# Patient Record
Sex: MALE | Race: WHITE | NOT HISPANIC OR LATINO | Employment: OTHER | URBAN - METROPOLITAN AREA
[De-identification: names, ages, dates, MRNs, and addresses within clinical notes are randomized per-mention and may not be internally consistent; named-entity substitution may affect disease eponyms.]

---

## 2017-08-10 ENCOUNTER — APPOINTMENT (OUTPATIENT)
Dept: RADIOLOGY | Facility: MEDICAL CENTER | Age: 67
DRG: 065 | End: 2017-08-10
Attending: EMERGENCY MEDICINE
Payer: MEDICARE

## 2017-08-10 ENCOUNTER — RESOLUTE PROFESSIONAL BILLING HOSPITAL PROF FEE (OUTPATIENT)
Dept: HOSPITALIST | Facility: MEDICAL CENTER | Age: 67
End: 2017-08-10
Payer: MEDICARE

## 2017-08-10 ENCOUNTER — HOSPITAL ENCOUNTER (INPATIENT)
Facility: MEDICAL CENTER | Age: 67
LOS: 6 days | DRG: 065 | End: 2017-08-16
Attending: EMERGENCY MEDICINE | Admitting: INTERNAL MEDICINE
Payer: MEDICARE

## 2017-08-10 DIAGNOSIS — E87.6 HYPOKALEMIA: ICD-10-CM

## 2017-08-10 DIAGNOSIS — I63.81 THALAMIC INFARCT, ACUTE (HCC): ICD-10-CM

## 2017-08-10 DIAGNOSIS — I10 ESSENTIAL HYPERTENSION: ICD-10-CM

## 2017-08-10 DIAGNOSIS — I63.9 CEREBROVASCULAR ACCIDENT (CVA), UNSPECIFIED MECHANISM (HCC): ICD-10-CM

## 2017-08-10 PROBLEM — E87.1 HYPONATREMIA: Status: ACTIVE | Noted: 2017-08-10

## 2017-08-10 PROBLEM — I16.0 HYPERTENSIVE URGENCY: Status: ACTIVE | Noted: 2017-08-10

## 2017-08-10 PROBLEM — R73.9 HYPERGLYCEMIA: Status: ACTIVE | Noted: 2017-08-10

## 2017-08-10 PROBLEM — R42 DIZZINESS: Status: ACTIVE | Noted: 2017-08-10

## 2017-08-10 LAB
ALBUMIN SERPL BCP-MCNC: 4.2 G/DL (ref 3.2–4.9)
ALBUMIN/GLOB SERPL: 1.1 G/DL
ALP SERPL-CCNC: 77 U/L (ref 30–99)
ALT SERPL-CCNC: 10 U/L (ref 2–50)
ANION GAP SERPL CALC-SCNC: 13 MMOL/L (ref 0–11.9)
APTT PPP: 23.7 SEC (ref 24.7–36)
AST SERPL-CCNC: 20 U/L (ref 12–45)
BASOPHILS # BLD AUTO: 1.3 % (ref 0–1.8)
BASOPHILS # BLD: 0.12 K/UL (ref 0–0.12)
BILIRUB SERPL-MCNC: 1.1 MG/DL (ref 0.1–1.5)
BUN SERPL-MCNC: 9 MG/DL (ref 8–22)
CALCIUM SERPL-MCNC: 10.1 MG/DL (ref 8.5–10.5)
CHLORIDE SERPL-SCNC: 84 MMOL/L (ref 96–112)
CO2 SERPL-SCNC: 31 MMOL/L (ref 20–33)
CREAT SERPL-MCNC: 0.87 MG/DL (ref 0.5–1.4)
EKG IMPRESSION: NORMAL
EOSINOPHIL # BLD AUTO: 0.13 K/UL (ref 0–0.51)
EOSINOPHIL NFR BLD: 1.4 % (ref 0–6.9)
ERYTHROCYTE [DISTWIDTH] IN BLOOD BY AUTOMATED COUNT: 37.7 FL (ref 35.9–50)
GFR SERPL CREATININE-BSD FRML MDRD: >60 ML/MIN/1.73 M 2
GLOBULIN SER CALC-MCNC: 3.8 G/DL (ref 1.9–3.5)
GLUCOSE SERPL-MCNC: 113 MG/DL (ref 65–99)
HCT VFR BLD AUTO: 49.4 % (ref 42–52)
HGB BLD-MCNC: 18 G/DL (ref 14–18)
IMM GRANULOCYTES # BLD AUTO: 0.02 K/UL (ref 0–0.11)
IMM GRANULOCYTES NFR BLD AUTO: 0.2 % (ref 0–0.9)
INR PPP: 0.89 (ref 0.87–1.13)
LYMPHOCYTES # BLD AUTO: 2.16 K/UL (ref 1–4.8)
LYMPHOCYTES NFR BLD: 23.4 % (ref 22–41)
MAGNESIUM SERPL-MCNC: 2.1 MG/DL (ref 1.5–2.5)
MCH RBC QN AUTO: 29.5 PG (ref 27–33)
MCHC RBC AUTO-ENTMCNC: 36.4 G/DL (ref 33.7–35.3)
MCV RBC AUTO: 80.9 FL (ref 81.4–97.8)
MONOCYTES # BLD AUTO: 0.91 K/UL (ref 0–0.85)
MONOCYTES NFR BLD AUTO: 9.8 % (ref 0–13.4)
NEUTROPHILS # BLD AUTO: 5.91 K/UL (ref 1.82–7.42)
NEUTROPHILS NFR BLD: 63.9 % (ref 44–72)
NRBC # BLD AUTO: 0 K/UL
NRBC BLD AUTO-RTO: 0 /100 WBC
PHOSPHATE SERPL-MCNC: 2.9 MG/DL (ref 2.5–4.5)
PLATELET # BLD AUTO: 238 K/UL (ref 164–446)
PMV BLD AUTO: 11.4 FL (ref 9–12.9)
POTASSIUM SERPL-SCNC: 2.3 MMOL/L (ref 3.6–5.5)
PROT SERPL-MCNC: 8 G/DL (ref 6–8.2)
PROTHROMBIN TIME: 12.3 SEC (ref 12–14.6)
RBC # BLD AUTO: 6.11 M/UL (ref 4.7–6.1)
SODIUM SERPL-SCNC: 128 MMOL/L (ref 135–145)
TROPONIN I SERPL-MCNC: 0.02 NG/ML (ref 0–0.04)
WBC # BLD AUTO: 9.3 K/UL (ref 4.8–10.8)

## 2017-08-10 PROCEDURE — 84100 ASSAY OF PHOSPHORUS: CPT

## 2017-08-10 PROCEDURE — A9270 NON-COVERED ITEM OR SERVICE: HCPCS | Performed by: EMERGENCY MEDICINE

## 2017-08-10 PROCEDURE — 85610 PROTHROMBIN TIME: CPT

## 2017-08-10 PROCEDURE — 84484 ASSAY OF TROPONIN QUANT: CPT

## 2017-08-10 PROCEDURE — 36415 COLL VENOUS BLD VENIPUNCTURE: CPT

## 2017-08-10 PROCEDURE — 85025 COMPLETE CBC W/AUTO DIFF WBC: CPT

## 2017-08-10 PROCEDURE — 99407 BEHAV CHNG SMOKING > 10 MIN: CPT | Performed by: INTERNAL MEDICINE

## 2017-08-10 PROCEDURE — 96365 THER/PROPH/DIAG IV INF INIT: CPT

## 2017-08-10 PROCEDURE — 700111 HCHG RX REV CODE 636 W/ 250 OVERRIDE (IP): Performed by: EMERGENCY MEDICINE

## 2017-08-10 PROCEDURE — 700105 HCHG RX REV CODE 258: Performed by: EMERGENCY MEDICINE

## 2017-08-10 PROCEDURE — 83735 ASSAY OF MAGNESIUM: CPT

## 2017-08-10 PROCEDURE — 71010 DX-CHEST-PORTABLE (1 VIEW): CPT

## 2017-08-10 PROCEDURE — 99285 EMERGENCY DEPT VISIT HI MDM: CPT

## 2017-08-10 PROCEDURE — 770020 HCHG ROOM/CARE - TELE (206)

## 2017-08-10 PROCEDURE — 700102 HCHG RX REV CODE 250 W/ 637 OVERRIDE(OP): Performed by: EMERGENCY MEDICINE

## 2017-08-10 PROCEDURE — 99223 1ST HOSP IP/OBS HIGH 75: CPT | Mod: 25 | Performed by: INTERNAL MEDICINE

## 2017-08-10 PROCEDURE — 93005 ELECTROCARDIOGRAM TRACING: CPT

## 2017-08-10 PROCEDURE — 93005 ELECTROCARDIOGRAM TRACING: CPT | Performed by: EMERGENCY MEDICINE

## 2017-08-10 PROCEDURE — 85730 THROMBOPLASTIN TIME PARTIAL: CPT

## 2017-08-10 PROCEDURE — 96361 HYDRATE IV INFUSION ADD-ON: CPT

## 2017-08-10 PROCEDURE — 70450 CT HEAD/BRAIN W/O DYE: CPT

## 2017-08-10 PROCEDURE — 96368 THER/DIAG CONCURRENT INF: CPT

## 2017-08-10 PROCEDURE — 80053 COMPREHEN METABOLIC PANEL: CPT

## 2017-08-10 RX ORDER — NICOTINE 21 MG/24HR
21 PATCH, TRANSDERMAL 24 HOURS TRANSDERMAL
Status: DISCONTINUED | OUTPATIENT
Start: 2017-08-11 | End: 2017-08-16 | Stop reason: HOSPADM

## 2017-08-10 RX ORDER — SODIUM CHLORIDE 9 MG/ML
1000 INJECTION, SOLUTION INTRAVENOUS ONCE
Status: COMPLETED | OUTPATIENT
Start: 2017-08-10 | End: 2017-08-10

## 2017-08-10 RX ORDER — POTASSIUM CHLORIDE 20 MEQ/1
40 TABLET, EXTENDED RELEASE ORAL ONCE
Status: COMPLETED | OUTPATIENT
Start: 2017-08-10 | End: 2017-08-10

## 2017-08-10 RX ORDER — POTASSIUM CHLORIDE 7.45 MG/ML
10 INJECTION INTRAVENOUS
Status: COMPLETED | OUTPATIENT
Start: 2017-08-10 | End: 2017-08-11

## 2017-08-10 RX ORDER — HYDROCHLOROTHIAZIDE 25 MG/1
25 TABLET ORAL ONCE
Status: COMPLETED | OUTPATIENT
Start: 2017-08-10 | End: 2017-08-10

## 2017-08-10 RX ORDER — ZOLPIDEM TARTRATE 10 MG/1
15 TABLET ORAL NIGHTLY PRN
COMMUNITY

## 2017-08-10 RX ORDER — ALBUTEROL SULFATE 90 UG/1
2 AEROSOL, METERED RESPIRATORY (INHALATION) EVERY 6 HOURS PRN
COMMUNITY
End: 2019-01-01

## 2017-08-10 RX ADMIN — SODIUM CHLORIDE 1000 ML: 9 INJECTION, SOLUTION INTRAVENOUS at 21:54

## 2017-08-10 RX ADMIN — POTASSIUM CHLORIDE 40 MEQ: 1500 TABLET, EXTENDED RELEASE ORAL at 22:44

## 2017-08-10 RX ADMIN — HYDROCHLOROTHIAZIDE 25 MG: 25 TABLET ORAL at 22:15

## 2017-08-10 RX ADMIN — POTASSIUM CHLORIDE 10 MEQ: 10 INJECTION, SOLUTION INTRAVENOUS at 23:34

## 2017-08-10 RX ADMIN — MAGNESIUM SULFATE IN DEXTROSE 1 G: 10 INJECTION, SOLUTION INTRAVENOUS at 23:34

## 2017-08-10 RX ADMIN — SODIUM CHLORIDE 1000 ML: 9 INJECTION, SOLUTION INTRAVENOUS at 23:34

## 2017-08-10 ASSESSMENT — LIFESTYLE VARIABLES: DO YOU DRINK ALCOHOL: NO

## 2017-08-10 ASSESSMENT — ENCOUNTER SYMPTOMS
CONFUSION: 0
NAUSEA: 0
FATIGUE: 0
HEADACHES: 0
BACK PAIN: 0
VOMITING: 0
DIARRHEA: 0
SHORTNESS OF BREATH: 0
ABDOMINAL PAIN: 0
FEVER: 0
COUGH: 0

## 2017-08-10 ASSESSMENT — PAIN SCALES - GENERAL: PAINLEVEL_OUTOF10: 8

## 2017-08-10 NOTE — IP AVS SNAPSHOT
" Home Care Instructions                                                                                                                  Name:Will Fuentes  Medical Record Number:[id number]  CSN: [id number]    YOB: 1950   Age: 66 y.o.  Sex: male  HT:1.753 m (5' 9.02\") WT: 69.1 kg (152 lb 5.4 oz)          Admit Date: 8/10/2017     Discharge Date:   Today's Date: 8/16/2017  Attending Doctor:  Andrew Vasquez M.D.                  Allergies:  Darvocet; Flexeril; and Nsaids            Discharge Instructions       Discharge Instructions    Discharged to home by car with friend. Discharged via wheelchair, hospital escort: Yes.  Special equipment needed: Not Applicable    Be sure to schedule a follow-up appointment with your primary care doctor or any specialists as instructed.     Discharge Plan:   Diet Plan: Discussed  Activity Level: Discussed  Smoking Cessation Offered: Patient Counseled  Confirmed Follow up Appointment: Patient to Call and Schedule Appointment (Please call to schedule an appointment to follow up with your Primary Care Physician in 1-2 weeks.)  Confirmed Symptoms Management: Discussed  Medication Reconciliation Updated: Yes  Influenza Vaccine Indication: Patient Refuses    I understand that a diet low in cholesterol, fat, and sodium is recommended for good health. Unless I have been given specific instructions below for another diet, I accept this instruction as my diet prescription.   Other diet: Regular Diet As Tolerated    Special Instructions: Take your medication as prescribed and follow up with your primary care physician in 1-2 weeks.    · Is patient discharged on Warfarin / Coumadin?   No     · Is patient Post Blood Transfusion?  No    Depression / Suicide Risk    As you are discharged from this [facility] facility, it is important to learn how to keep safe from harming yourself.    Recognize the warning signs:  · Abrupt changes in personality, positive or negative- " including increase in energy   · Giving away possessions  · Change in eating patterns- significant weight changes-  positive or negative  · Change in sleeping patterns- unable to sleep or sleeping all the time   · Unwillingness or inability to communicate  · Depression  · Unusual sadness, discouragement and loneliness  · Talk of wanting to die  · Neglect of personal appearance   · Rebelliousness- reckless behavior  · Withdrawal from people/activities they love  · Confusion- inability to concentrate     If you or a loved one observes any of these behaviors or has concerns about self-harm, here's what you can do:  · Talk about it- your feelings and reasons for harming yourself  · Remove any means that you might use to hurt yourself (examples: pills, rope, extension cords, firearm)  · Get professional help from the community (Mental Health, Substance Abuse, psychological counseling)  · Do not be alone:Call your Safe Contact- someone whom you trust who will be there for you.  · Call your local CRISIS HOTLINE 861-7761 or 674-409-4873  · Call your local Children's Mobile Crisis Response Team [place] (869) 429-4833 or wwwSleepy's  · Call the toll free National Suicide Prevention Hotlines   · National Suicide Prevention Lifeline 501-443-NFCN (2200)  · National Hope Line Network 800-SUICIDE (528-1690)        Your appointments     Aug 17, 2017 11:30 AM   CARE MANAGER TELEPHONE VISIT with CARE MANAGER   [address]  [facility] [address] [phone]           ***IMPORTANT**** This is a phone visit only. Do not come into the office. The Care Manager will call you at the scheduled time for Care Manager Telephone Visit.            Aug 18, 2017  1:40 PM   Established Patient with YONNY Masters   [facility]    [address] [place] [address] [address]  [place] [address] [phone]           You will be receiving a confirmation call a few days  before your appointment from our automated call confirmation system.            Sep 18, 2017  2:20 PM   [facility] with [facility]   [facility] (--)    [address], [address]  [place] [address] [phone]           Please bring Photo ID, Insurance Cards, All Medication Bottles and copies of any legal documents (such as Living Will, Power of ) If speaking a language besides English please bring an adult . Please arrive 30 minutes prior for check in and registration. You will be receiving a confirmation call a few days before your appointment from our automated call confirmation system.                 Discharge Medication Instructions:    Below are the medications your physician expects you to take upon discharge:    Review all your home medications and newly ordered medications with your doctor and/or pharmacist. Follow medication instructions as directed by your doctor and/or pharmacist.    Please keep your medication list with you and share with your physician.               Medication List      START taking these medications        Instructions    Morning Afternoon Evening Bedtime    amlodipine 10 MG Tabs   Last time this was given:  10 mg on 8/14/2017  8:27 AM   Commonly known as:  NORVASC   Next Dose Due:  8/14/17 Morning        Take 1 Tab by mouth every day.   Dose:  10 mg                        atorvastatin 80 MG tablet   Last time this was given:  80 mg on 8/15/2017 10:36 PM   Commonly known as:  LIPITOR        Take 0.5 Tabs by mouth every evening for 30 days.   Dose:  40 mg                        clopidogrel 75 MG Tabs   Last time this was given:  75 mg on 8/16/2017  9:16 AM   Commonly known as:  PLAVIX        Take 1 Tab by mouth every day.   Dose:  75 mg                        lisinopril 20 MG Tabs   Last time this was given:  20 mg on 8/14/2017  8:27 AM   Commonly known as:  PRINIVIL   Next Dose Due:  8/14/17 Morning        Take 1 Tab by mouth  every day.   Dose:  20 mg                        potassium chloride SA 20 MEQ Tbcr   Last time this was given:  20 mEq on 8/16/2017  9:16 AM   Commonly known as:  Kdur   Next Dose Due:  8/14/17 Morning        Take 2 Tabs by mouth every day for 5 days.   Dose:  40 mEq                          CONTINUE taking these medications        Instructions    Morning Afternoon Evening Bedtime    ADVAIR DISKUS 100-50 MCG/DOSE Aepb   Generic drug:  fluticasone-salmeterol   Next Dose Due:  8/13/17 Evening        Inhale 1 Puff by mouth 2 times a day as needed.   Dose:  1 Puff                        albuterol 108 (90 BASE) MCG/ACT Aers inhalation aerosol   Next Dose Due:  As needed        Inhale 2 Puffs by mouth every 6 hours as needed for Shortness of Breath.   Dose:  2 Puff                        AMBIEN 10 MG Tabs   Last time this was given:  10 mg on 8/14/2017  9:36 PM   Generic drug:  zolpidem   Next Dose Due:  8/13/17 Evening As Needed        Take 10 mg by mouth at bedtime as needed for Sleep.   Dose:  10 mg                        hydrocodone/acetaminophen  MG Tabs   Last time this was given:  1 Tab on 8/16/2017  5:51 AM   Commonly known as:  NORCO   Next Dose Due:  As Needed        Take 1 Tab by mouth every 6 hours as needed for Mild Pain.   Dose:  1 Tab                        morphine SR 60 MG Tb12   Commonly known as:  MS CONTIN   Next Dose Due:  8/14/17 Morning        Take 1 Tab by mouth 3 times a day.   Dose:  60 mg                             Where to Get Your Medications      These medications were sent to [facility][id number] - [place], [place] - [address]  [address] [address]     Phone:  [phone]    - amlodipine 10 MG Tabs  - atorvastatin 80 MG tablet  - lisinopril 20 MG Tabs  - potassium chloride SA 20 MEQ Tbcr      Information about where to get these medications is not yet available     ! Ask your nurse or doctor about these medications    - clopidogrel 75 MG Tabs               Orders for after discharge     REFERRAL TO HOME HEALTH    Complete by:  As directed    Home health will create and establish a plan of care             Instructions           Diet / Nutrition:    Follow any diet instructions given to you by your doctor or the dietician, including how much salt (sodium) you are allowed each day.    If you are overweight, talk to your doctor about a weight reduction plan.    Activity:    Remain physically active following your doctor's instructions about exercise and activity.    Rest often.     Any time you become even a little tired or short of breath, SIT DOWN and rest.    Worsening Symptoms:    Report any of the following signs and symptoms to the doctor's office immediately:    *Pain of jaw, arm, or neck  *Chest pain not relieved by medication                               *Dizziness or loss of consciousness  *Difficulty breathing even when at rest   *More tired than usual                                       *Bleeding drainage or swelling of surgical site  *Swelling of feet, ankles, legs or stomach                 *Fever (>100ºF)  *Pink or blood tinged sputum  *Weight gain (3lbs/day or 5lbs /week)           *Shock from internal defibrillator (if applicable)  *Palpitations or irregular heartbeats                *Cool and/or numb extremities    Stroke Awareness    Common Risk Factors for Stroke include:    Age  Atrial Fibrillation  Carotid Artery Stenosis  Diabetes Mellitus  Excessive alcohol consumption  High blood pressure  Overweight   Physical inactivity  Smoking    Warning signs and symptoms of a stroke include:    *Sudden numbness or weakness of the face, arm or leg (especially on one side of the body).  *Sudden confusion, trouble speaking or understanding.  *Sudden trouble seeing in one or both eyes.  *Sudden trouble walking, dizziness, loss of balance or coordination.Sudden severe headache with no known cause.    It is very important to get treatment quickly when a  stroke occurs. If you experience any of the above warning signs, call 911 immediately.                   Disclaimer         Quit Smoking / Tobacco Use:    I understand the use of any tobacco products increases my chance of suffering from future heart disease or stroke and could cause other illnesses which may shorten my life. Quitting the use of tobacco products is the single most important thing I can do to improve my health. For further information on smoking / tobacco cessation call a Toll Free Quit Line at 1-401.458.8046 (*National [employer] [place]) or 1-727.487.5292 (American Lung Association) or you can access the web based program at www.lungusa.org.    [place] Tobacco Users Help Line:  (124) 751-5617       Toll Free: 1-106.523.7489  Quit Tobacco Program [facility] (112)295-4956    Crisis Hotline:    [place] Crisis Hotline:  1-339-VSSEQJI or 1-893.587.5867    [place] Crisis Hotline:    1-284.263.8462 or 152-991-3727    Discharge Survey:   Thank you for choosing [facility]. We hope we did everything we could to make your hospital stay a pleasant one. You may be receiving a phone survey and we would appreciate your time and participation in answering the questions. Your input is very valuable to us in our efforts to improve our service to our patients and their families.        My signature on this form indicates that:    1. I have reviewed and understand the above information.  2. My questions regarding this information have been answered to my satisfaction.  3. I have formulated a plan with my discharge nurse to obtain my prescribed medications for home.                  Disclaimer         __________________________________                     __________       ________                       Patient Signature                                                 Date                    Time

## 2017-08-10 NOTE — IP AVS SNAPSHOT
8/16/2017    Will Fuentes  [address]  [place] [address]    Dear Will:    Critical access hospital wants to ensure your discharge home is safe and you or your loved ones have had all of your questions answered regarding your care after you leave the hospital.    Below is a list of resources and contact information should you have any questions regarding your hospital stay, follow-up instructions, or active medical symptoms.    Questions or Concerns Regarding… Contact   Medical Questions Related to Your Discharge  (7 days a week, 8am-5pm) Contact a Nurse Care Coordinator   [phone]   Medical Questions Not Related to Your Discharge  (24 hours a day / 7 days a week)  Contact the Nurse Health Line   [phone]    Medications or Discharge Instructions Refer to your discharge packet   or contact your [facility] Primary Care Provider   [phone]   Follow-up Appointment(s) Schedule your appointment via Samanage   or contact Scheduling [phone]   Billing Review your statement via Samanage  or contact Billing [phone]   Medical Records Review your records via Samanage   or contact Medical Records [phone]     You may receive a telephone call within two days of discharge. This call is to make certain you understand your discharge instructions and have the opportunity to have any questions answered. You can also easily access your medical information, test results and upcoming appointments via the Samanage free online health management tool. You can learn more and sign up at [employer]/Samanage. For assistance setting up your Samanage account, please call [phone].    Once again, we want to ensure your discharge home is safe and that you have a clear understanding of any next steps in your care. If you have any questions or concerns, please do not hesitate to contact us, we are here for you. Thank you for choosing [facility] for your healthcare needs.    Sincerely,    Your [facility] Healthcare Team

## 2017-08-10 NOTE — IP AVS SNAPSHOT
" <p align=\"LEFT\"><IMG SRC=\"//EMRWB/blob$/[email or web address]\" alt=\"Image\" WIDTH=\"50%\" HEIGHT=\"200\" BORDER=\"\"></p>                   Name:Will Fuentes  Medical Record Number:[id number]  CSN: [id number]    YOB: 1950   Age: 66 y.o.  Sex: male  HT:1.753 m (5' 9.02\") WT: 69.1 kg (152 lb 5.4 oz)          Admit Date: 8/10/2017     Discharge Date:   Today's Date: 8/16/2017  Attending Doctor:  Andrew Vasquez M.D.                  Allergies:  Darvocet; Flexeril; and Nsaids          Your appointments     Aug 17, 2017 11:30 AM   CARE MANAGER TELEPHONE VISIT with CARE MANAGER   [address]  [facility] [id number] [phone]           ***IMPORTANT**** This is a phone visit only. Do not come into the office. The Care Manager will call you at the scheduled time for Care Manager Telephone Visit.            Aug 18, 2017  1:40 PM   Established Patient with YONNY Masters   [address]  [facility] [id number] [phone]           You will be receiving a confirmation call a few days before your appointment from our automated call confirmation system.            Sep 18, 2017  2:20 PM   New [facility] with [facility]   [facility] (--)    [address], [address]  [facility] [address] [phone]           Please bring Photo ID, Insurance Cards, All Medication Bottles and copies of any legal documents (such as Living Will, Power of ) If speaking a language besides English please bring an adult . Please arrive 30 minutes prior for check in and registration. You will be receiving a confirmation call a few days before your appointment from our automated call confirmation system.                 Medication List      Take these Medications        Instructions    ADVAIR DISKUS 100-50 MCG/DOSE Aepb   Generic drug:  fluticasone-salmeterol    Inhale 1 Puff by mouth 2 times a " day as needed.   Dose:  1 Puff       albuterol 108 (90 BASE) MCG/ACT Aers inhalation aerosol    Inhale 2 Puffs by mouth every 6 hours as needed for Shortness of Breath.   Dose:  2 Puff       AMBIEN 10 MG Tabs   Generic drug:  zolpidem    Take 10 mg by mouth at bedtime as needed for Sleep.   Dose:  10 mg       amlodipine 10 MG Tabs   Commonly known as:  NORVASC    Take 1 Tab by mouth every day.   Dose:  10 mg       atorvastatin 80 MG tablet   Commonly known as:  LIPITOR    Take 0.5 Tabs by mouth every evening for 30 days.   Dose:  40 mg       clopidogrel 75 MG Tabs   Commonly known as:  PLAVIX    Take 1 Tab by mouth every day.   Dose:  75 mg       hydrocodone/acetaminophen  MG Tabs   Commonly known as:  NORCO    Take 1 Tab by mouth every 6 hours as needed for Mild Pain.   Dose:  1 Tab       lisinopril 20 MG Tabs   Commonly known as:  PRINIVIL    Take 1 Tab by mouth every day.   Dose:  20 mg       morphine SR 60 MG Tb12   Commonly known as:  MS CONTIN    Take 1 Tab by mouth 3 times a day.   Dose:  60 mg       potassium chloride SA 20 MEQ Tbcr   Commonly known as:  Kdur    Take 2 Tabs by mouth every day for 5 days.   Dose:  40 mEq

## 2017-08-10 NOTE — IP AVS SNAPSHOT
Tercica Access Code: [email or web address]  Expires: 9/12/2017  5:53 PM    Your email address is not on file at [employer].  Email Addresses are required for you to sign up for Tercica, please contact [phone] to verify your personal information and to provide your email address prior to attempting to register for Tercica.    Will Fuentes  [address] [place] [address]    Tercica  A secure, online tool to manage your health information     [employer]’s Tercica® is a secure, online tool that connects you to your personalized health information from the privacy of your home -- day or night - making it very easy for you to manage your healthcare. Once the activation process is completed, you can even access your medical information using the Tercica suzie, which is available for free in the Apple Suzie store or Google Play store.     To learn more about Tercica, visit [email or web address]    There are two levels of access available (as shown below):   My Chart Features  [facility] Primary Care Doctor [facility]  Specialists [facility]  Urgent  Care Non-[facility] Primary Care Doctor   Email your healthcare team securely and privately 24/7 X X X    Manage appointments: schedule your next appointment; view details of past/upcoming appointments X      Request prescription refills. X      View recent personal medical records, including lab and immunizations X X X X   View health record, including health history, allergies, medications X X X X   Read reports about your outpatient visits, procedures, consult and ER notes X X X X   See your discharge summary, which is a recap of your hospital and/or ER visit that includes your diagnosis, lab results, and care plan X X  X     How to register for Tercica:  Once your e-mail address has been verified, follow the following steps to sign up for Tercica.     1. Go to  [email or web address]  2. Click on the Sign Up Now box, which takes you to the New Member Sign Up page. You  will need to provide the following information:  a. Enter your Book Buyback Access Code exactly as it appears at the top of this page. (You will not need to use this code after you’ve completed the sign-up process. If you do not sign up before the expiration date, you must request a new code.)   b. Enter your date of birth.   c. Enter your home email address.   d. Click Submit, and follow the next screen’s instructions.  3. Create a Passlogixt ID. This will be your Book Buyback login ID and cannot be changed, so think of one that is secure and easy to remember.  4. Create a Book Buyback password. You can change your password at any time.  5. Enter your Password Reset Question and Answer. This can be used at a later time if you forget your password.   6. Enter your e-mail address. This allows you to receive e-mail notifications when new information is available in Book Buyback.  7. Click Sign Up. You can now view your health information.    For assistance activating your Book Buyback account, call [phone]

## 2017-08-10 NOTE — ED NOTES
"Pt to triage .  Chief Complaint   Patient presents with   • Near Syncopal   • Other     \"I cant control my legs\"    • Dizziness   • Lightheadedness     "

## 2017-08-11 PROBLEM — F17.210 DEPENDENCE ON NICOTINE FROM CIGARETTES: Status: ACTIVE | Noted: 2017-08-11

## 2017-08-11 LAB
ANION GAP SERPL CALC-SCNC: 7 MMOL/L (ref 0–11.9)
ANION GAP SERPL CALC-SCNC: 9 MMOL/L (ref 0–11.9)
BUN SERPL-MCNC: 13 MG/DL (ref 8–22)
BUN SERPL-MCNC: 9 MG/DL (ref 8–22)
CALCIUM SERPL-MCNC: 9 MG/DL (ref 8.5–10.5)
CALCIUM SERPL-MCNC: 9.4 MG/DL (ref 8.5–10.5)
CHLORIDE SERPL-SCNC: 92 MMOL/L (ref 96–112)
CHLORIDE SERPL-SCNC: 95 MMOL/L (ref 96–112)
CO2 SERPL-SCNC: 27 MMOL/L (ref 20–33)
CO2 SERPL-SCNC: 29 MMOL/L (ref 20–33)
CREAT SERPL-MCNC: 0.78 MG/DL (ref 0.5–1.4)
CREAT SERPL-MCNC: 0.98 MG/DL (ref 0.5–1.4)
EST. AVERAGE GLUCOSE BLD GHB EST-MCNC: 117 MG/DL
GFR SERPL CREATININE-BSD FRML MDRD: >60 ML/MIN/1.73 M 2
GFR SERPL CREATININE-BSD FRML MDRD: >60 ML/MIN/1.73 M 2
GLUCOSE SERPL-MCNC: 122 MG/DL (ref 65–99)
GLUCOSE SERPL-MCNC: 93 MG/DL (ref 65–99)
HBA1C MFR BLD: 5.7 % (ref 0–5.6)
MAGNESIUM SERPL-MCNC: 2.1 MG/DL (ref 1.5–2.5)
OSMOLALITY SERPL: 264 MOSM/KG H2O (ref 278–298)
POTASSIUM SERPL-SCNC: 2.6 MMOL/L (ref 3.6–5.5)
POTASSIUM SERPL-SCNC: 3 MMOL/L (ref 3.6–5.5)
SODIUM SERPL-SCNC: 128 MMOL/L (ref 135–145)
SODIUM SERPL-SCNC: 131 MMOL/L (ref 135–145)

## 2017-08-11 PROCEDURE — 83930 ASSAY OF BLOOD OSMOLALITY: CPT

## 2017-08-11 PROCEDURE — 700102 HCHG RX REV CODE 250 W/ 637 OVERRIDE(OP)

## 2017-08-11 PROCEDURE — 93005 ELECTROCARDIOGRAM TRACING: CPT | Performed by: HOSPITALIST

## 2017-08-11 PROCEDURE — 93010 ELECTROCARDIOGRAM REPORT: CPT | Performed by: INTERNAL MEDICINE

## 2017-08-11 PROCEDURE — 36415 COLL VENOUS BLD VENIPUNCTURE: CPT

## 2017-08-11 PROCEDURE — 700102 HCHG RX REV CODE 250 W/ 637 OVERRIDE(OP): Performed by: INTERNAL MEDICINE

## 2017-08-11 PROCEDURE — 96361 HYDRATE IV INFUSION ADD-ON: CPT

## 2017-08-11 PROCEDURE — 700111 HCHG RX REV CODE 636 W/ 250 OVERRIDE (IP): Performed by: EMERGENCY MEDICINE

## 2017-08-11 PROCEDURE — 99232 SBSQ HOSP IP/OBS MODERATE 35: CPT | Performed by: HOSPITALIST

## 2017-08-11 PROCEDURE — 770020 HCHG ROOM/CARE - TELE (206)

## 2017-08-11 PROCEDURE — 700111 HCHG RX REV CODE 636 W/ 250 OVERRIDE (IP): Performed by: INTERNAL MEDICINE

## 2017-08-11 PROCEDURE — 700111 HCHG RX REV CODE 636 W/ 250 OVERRIDE (IP): Performed by: HOSPITALIST

## 2017-08-11 PROCEDURE — A9270 NON-COVERED ITEM OR SERVICE: HCPCS

## 2017-08-11 PROCEDURE — 83036 HEMOGLOBIN GLYCOSYLATED A1C: CPT

## 2017-08-11 PROCEDURE — 83735 ASSAY OF MAGNESIUM: CPT

## 2017-08-11 PROCEDURE — A9270 NON-COVERED ITEM OR SERVICE: HCPCS | Performed by: INTERNAL MEDICINE

## 2017-08-11 PROCEDURE — 80048 BASIC METABOLIC PNL TOTAL CA: CPT

## 2017-08-11 PROCEDURE — 700101 HCHG RX REV CODE 250: Performed by: INTERNAL MEDICINE

## 2017-08-11 RX ORDER — ENALAPRILAT 1.25 MG/ML
1.25 INJECTION INTRAVENOUS EVERY 6 HOURS PRN
Status: DISCONTINUED | OUTPATIENT
Start: 2017-08-11 | End: 2017-08-14

## 2017-08-11 RX ORDER — POTASSIUM CHLORIDE 7.45 MG/ML
10 INJECTION INTRAVENOUS
Status: COMPLETED | OUTPATIENT
Start: 2017-08-11 | End: 2017-08-11

## 2017-08-11 RX ORDER — CITALOPRAM HYDROBROMIDE 10 MG/1
10 TABLET ORAL DAILY
Status: DISCONTINUED | OUTPATIENT
Start: 2017-08-11 | End: 2017-08-11

## 2017-08-11 RX ORDER — POLYETHYLENE GLYCOL 3350 17 G/17G
1 POWDER, FOR SOLUTION ORAL
Status: DISCONTINUED | OUTPATIENT
Start: 2017-08-11 | End: 2017-08-13 | Stop reason: ALTCHOICE

## 2017-08-11 RX ORDER — HEPARIN SODIUM 5000 [USP'U]/ML
5000 INJECTION, SOLUTION INTRAVENOUS; SUBCUTANEOUS EVERY 8 HOURS
Status: DISCONTINUED | OUTPATIENT
Start: 2017-08-11 | End: 2017-08-16 | Stop reason: HOSPADM

## 2017-08-11 RX ORDER — ACETAMINOPHEN 325 MG/1
650 TABLET ORAL EVERY 6 HOURS PRN
Status: DISCONTINUED | OUTPATIENT
Start: 2017-08-11 | End: 2017-08-16 | Stop reason: HOSPADM

## 2017-08-11 RX ORDER — AMOXICILLIN 250 MG
2 CAPSULE ORAL 2 TIMES DAILY
Status: DISCONTINUED | OUTPATIENT
Start: 2017-08-11 | End: 2017-08-13 | Stop reason: ALTCHOICE

## 2017-08-11 RX ORDER — SODIUM CHLORIDE AND POTASSIUM CHLORIDE 150; 900 MG/100ML; MG/100ML
INJECTION, SOLUTION INTRAVENOUS CONTINUOUS
Status: DISCONTINUED | OUTPATIENT
Start: 2017-08-11 | End: 2017-08-13

## 2017-08-11 RX ORDER — AMLODIPINE BESYLATE 10 MG/1
10 TABLET ORAL
Status: DISCONTINUED | OUTPATIENT
Start: 2017-08-11 | End: 2017-08-15

## 2017-08-11 RX ORDER — HYDROCODONE BITARTRATE AND ACETAMINOPHEN 10; 325 MG/1; MG/1
1 TABLET ORAL EVERY 6 HOURS PRN
Status: DISCONTINUED | OUTPATIENT
Start: 2017-08-11 | End: 2017-08-16 | Stop reason: HOSPADM

## 2017-08-11 RX ORDER — ONDANSETRON 2 MG/ML
4 INJECTION INTRAMUSCULAR; INTRAVENOUS EVERY 4 HOURS PRN
Status: DISCONTINUED | OUTPATIENT
Start: 2017-08-11 | End: 2017-08-16 | Stop reason: HOSPADM

## 2017-08-11 RX ORDER — LISINOPRIL 20 MG/1
20 TABLET ORAL
Status: DISCONTINUED | OUTPATIENT
Start: 2017-08-11 | End: 2017-08-15

## 2017-08-11 RX ORDER — ONDANSETRON 4 MG/1
4 TABLET, ORALLY DISINTEGRATING ORAL EVERY 4 HOURS PRN
Status: DISCONTINUED | OUTPATIENT
Start: 2017-08-11 | End: 2017-08-16 | Stop reason: HOSPADM

## 2017-08-11 RX ORDER — BUDESONIDE AND FORMOTEROL FUMARATE DIHYDRATE 80; 4.5 UG/1; UG/1
2 AEROSOL RESPIRATORY (INHALATION) 2 TIMES DAILY
Status: DISCONTINUED | OUTPATIENT
Start: 2017-08-11 | End: 2017-08-16 | Stop reason: HOSPADM

## 2017-08-11 RX ORDER — BISACODYL 10 MG
10 SUPPOSITORY, RECTAL RECTAL
Status: DISCONTINUED | OUTPATIENT
Start: 2017-08-11 | End: 2017-08-13 | Stop reason: ALTCHOICE

## 2017-08-11 RX ADMIN — POTASSIUM CHLORIDE 10 MEQ: 10 INJECTION, SOLUTION INTRAVENOUS at 16:07

## 2017-08-11 RX ADMIN — HEPARIN SODIUM 5000 UNITS: 5000 INJECTION, SOLUTION INTRAVENOUS; SUBCUTANEOUS at 20:50

## 2017-08-11 RX ADMIN — POTASSIUM CHLORIDE 10 MEQ: 10 INJECTION, SOLUTION INTRAVENOUS at 00:52

## 2017-08-11 RX ADMIN — LISINOPRIL 20 MG: 20 TABLET ORAL at 09:15

## 2017-08-11 RX ADMIN — BUDESONIDE AND FORMOTEROL FUMARATE DIHYDRATE 2 PUFF: 80; 4.5 AEROSOL RESPIRATORY (INHALATION) at 09:16

## 2017-08-11 RX ADMIN — NICOTINE 21 MG: 21 PATCH, EXTENDED RELEASE TRANSDERMAL at 05:07

## 2017-08-11 RX ADMIN — POTASSIUM CHLORIDE 10 MEQ: 10 INJECTION, SOLUTION INTRAVENOUS at 02:43

## 2017-08-11 RX ADMIN — POTASSIUM CHLORIDE 10 MEQ: 10 INJECTION, SOLUTION INTRAVENOUS at 17:40

## 2017-08-11 RX ADMIN — POTASSIUM CHLORIDE 10 MEQ: 10 INJECTION, SOLUTION INTRAVENOUS at 12:43

## 2017-08-11 RX ADMIN — HEPARIN SODIUM 5000 UNITS: 5000 INJECTION, SOLUTION INTRAVENOUS; SUBCUTANEOUS at 15:04

## 2017-08-11 RX ADMIN — POTASSIUM CHLORIDE 10 MEQ: 10 INJECTION, SOLUTION INTRAVENOUS at 18:51

## 2017-08-11 RX ADMIN — ENALAPRILAT 1.25 MG: 1.25 INJECTION, SOLUTION INTRAVENOUS at 01:20

## 2017-08-11 RX ADMIN — POTASSIUM CHLORIDE AND SODIUM CHLORIDE: 900; 150 INJECTION, SOLUTION INTRAVENOUS at 03:50

## 2017-08-11 RX ADMIN — POTASSIUM CHLORIDE AND SODIUM CHLORIDE: 900; 150 INJECTION, SOLUTION INTRAVENOUS at 18:55

## 2017-08-11 RX ADMIN — AMLODIPINE BESYLATE 10 MG: 10 TABLET ORAL at 09:15

## 2017-08-11 RX ADMIN — STANDARDIZED SENNA CONCENTRATE AND DOCUSATE SODIUM 2 TABLET: 8.6; 5 TABLET, FILM COATED ORAL at 09:15

## 2017-08-11 RX ADMIN — POTASSIUM CHLORIDE 10 MEQ: 10 INJECTION, SOLUTION INTRAVENOUS at 03:50

## 2017-08-11 RX ADMIN — BUDESONIDE AND FORMOTEROL FUMARATE DIHYDRATE 2 PUFF: 80; 4.5 AEROSOL RESPIRATORY (INHALATION) at 20:50

## 2017-08-11 ASSESSMENT — ENCOUNTER SYMPTOMS
COUGH: 0
WEAKNESS: 1
SPUTUM PRODUCTION: 0
MYALGIAS: 1
DIARRHEA: 0
SPEECH CHANGE: 0
TREMORS: 0
FLANK PAIN: 0
SENSORY CHANGE: 0
VOMITING: 0
ABDOMINAL PAIN: 0
PALPITATIONS: 0
FOCAL WEAKNESS: 0
EYE DISCHARGE: 0
BLURRED VISION: 0
CHILLS: 0
HEADACHES: 0
EYE REDNESS: 0
EYE PAIN: 0
WEIGHT LOSS: 0
MYALGIAS: 0
SEIZURES: 0
NAUSEA: 0
FEVER: 0
SHORTNESS OF BREATH: 0
NECK PAIN: 0
DEPRESSION: 0
STRIDOR: 0
ORTHOPNEA: 0
INSOMNIA: 0
NERVOUS/ANXIOUS: 0
DIZZINESS: 1
BACK PAIN: 1
HALLUCINATIONS: 0
BACK PAIN: 0
HEARTBURN: 0

## 2017-08-11 ASSESSMENT — LIFESTYLE VARIABLES
EVER_SMOKED: YES
DO YOU DRINK ALCOHOL: NO
ALCOHOL_USE: NO
SUBSTANCE_ABUSE: 1

## 2017-08-11 ASSESSMENT — PAIN SCALES - GENERAL
PAINLEVEL_OUTOF10: 0

## 2017-08-11 ASSESSMENT — COGNITIVE AND FUNCTIONAL STATUS - GENERAL
SUGGESTED CMS G CODE MODIFIER DAILY ACTIVITY: CI
TOILETING: A LITTLE
DAILY ACTIVITIY SCORE: 23
WALKING IN HOSPITAL ROOM: A LITTLE
SUGGESTED CMS G CODE MODIFIER MOBILITY: CJ
CLIMB 3 TO 5 STEPS WITH RAILING: A LITTLE
MOBILITY SCORE: 22

## 2017-08-11 NOTE — CARE PLAN
Problem: Safety  Goal: Will remain free from injury  Outcome: PROGRESSING AS EXPECTED  Pt remains free from falls or injuries. Bed alarm and strip alarm set and in place. Pt calls for assistance appropriately.     Problem: Venous Thromboembolism (VTW)/Deep Vein Thrombosis (DVT) Prevention:  Goal: Patient will participate in Venous Thrombosis (VTE)/Deep Vein Thrombosis (DVT)Prevention Measures  Outcome: PROGRESSING AS EXPECTED  Pt free from s/sx of DVT. Pt receiving heparin for VTE prophylaxis.

## 2017-08-11 NOTE — ASSESSMENT & PLAN NOTE
Started on amlodipine 10 mg, lisinopril 20 mg- improved bp control   Continue  IV enalapril 1.25 mg every 6 hours prn 180

## 2017-08-11 NOTE — PROGRESS NOTES
Pt arrived to floor by lily via transport. Pt care assumed. Monitor room notified of pt arrival to floor. Patient assessed. A&O x 4. No distress present; no pain. Call light, phone, and bedside table within reach. White board updated and plan of care discussed with patient. Bed alarm and strip alarm set and in place. Pt calls for assistance appropriately. Will continue to monitor.

## 2017-08-11 NOTE — ED NOTES
Med rec completed per pt at bedside  Allergies reviewed  No ABX in last 30 days   Pt has RX bottles for Hydrochlorothiazide 12.5mg (taken off of over 2 years ago),  Norco 10/325mg, and Ambien 10mg with him. Returned to pt after interview

## 2017-08-11 NOTE — H&P
"   Hospital Medicine History and Physical      Date of Service  8/10/2017    Chief Complaint  Chief Complaint   Patient presents with   • Near Syncopal   • Other     \"I cant control my legs\"    • Dizziness   • Lightheadedness       History of Presenting Illness  Alfredo is a 66 y.o. male  who presents with dizziness for the past few days. He said whenever he tried to get up and walk he dizziness. Denied any spinning sensation. Because of his dizziness he was having a hard time walking and having poor balance. So he finally decided to come to ER. In the ER he was found to have hypertensive emergency with systolic blood pressure over 200. Also found to have severe hypokalemia with potassium 2.3 He will be admitted to the hospital for further management.    Primary Care Physician  [facility]      Code Status  Full code per patient     Review of Systems  Review of Systems   Constitutional: Positive for malaise/fatigue. Negative for fever, chills and weight loss.   HENT: Negative for congestion and nosebleeds.    Eyes: Negative for blurred vision, pain, discharge and redness.   Respiratory: Negative for cough, sputum production, shortness of breath and stridor.    Cardiovascular: Negative for chest pain, palpitations and orthopnea.   Gastrointestinal: Negative for heartburn, nausea, vomiting, abdominal pain and diarrhea.   Genitourinary: Negative for dysuria, urgency and frequency.   Musculoskeletal: Negative for myalgias, back pain and neck pain.   Skin: Negative for itching and rash.   Neurological: Positive for dizziness and weakness. Negative for focal weakness, seizures and headaches.   Psychiatric/Behavioral: Negative for depression. The patient is not nervous/anxious and does not have insomnia.      Please see HPI, all other systems were reviewed and are negative (AMA/CMS criteria)     Past Medical History  Past Medical History   Diagnosis Date   • Hypertension    • Hypertension    • Chronic low back pain "         • Chronic left shoulder pain         • Psoriasis    • COPD    • Drug-seeking behavior    • Breath shortness    • Heart burn    • Pain    • Psychiatric problem      depression       Surgical History  Past Surgical History   Procedure Laterality Date   • Other orthopedic surgery       back,   • Shoulder surgery       Bilateral with screws    • Other orthopedic surgery       rt. leg.  spiral fx.        Medications  No current facility-administered medications on file prior to encounter.     Current Outpatient Prescriptions on File Prior to Encounter   Medication Sig Dispense Refill   • morphine SR (MS CONTIN) 60 MG TB12 Take 1 Tab by mouth 3 times a day. 21 Each 0   • hydrocodone/apap  (NORCO)  MG TABS Take 1 Tab by mouth every 6 hours as needed for Mild Pain. 150 Each 1   • ADVAIR DISKUS 100-50 MCG/DOSE INH MISC Inhale 1 Puff by mouth 2 times a day as needed.       Family History  No family history on file.  No pertinent family history    Social History  Social History   Substance Use Topics   • Smoking status: Current Every Day Smoker   • Smokeless tobacco: None      Comment: 2 ppd for 50 yrs   • Alcohol Use: No       Allergies  Allergies   Allergen Reactions   • Darvocet [Propoxyphene N-Apap]    • Flexeril [Cyclobenzaprine Hcl]    • Nsaids Swelling     Fluid retention        Physical Exam  Laboratory   Hemodynamics  Temp (24hrs), Av.4 °C (97.5 °F), Min:36.4 °C (97.5 °F), Max:36.4 °C (97.5 °F)   Temperature: 36.4 °C (97.5 °F)  Pulse  Av.5  Min: 64  Max: 85 Heart Rate (Monitored): 67  Blood Pressure : (!) 197/119 mmHg, NIBP: (!) 207/115 mmHg      Respiratory      Respiration: 18, Pulse Oximetry: 95 %             Physical Exam   Constitutional: He is oriented to person, place, and time. No distress.   HENT:   Head: Normocephalic and atraumatic.   Mouth/Throat: Oropharynx is clear and moist.   Eyes: Conjunctivae and EOM are normal. Pupils are equal, round, and reactive to  light.   Neck: Normal range of motion. Neck supple. No tracheal deviation present. No thyromegaly present.   Cardiovascular: Normal rate and regular rhythm.    No murmur heard.  Pulmonary/Chest: Effort normal and breath sounds normal. No respiratory distress. He has no wheezes.   Abdominal: Soft. Bowel sounds are normal. He exhibits no distension. There is no tenderness.   Musculoskeletal: He exhibits no edema or tenderness.   Neurological: He is alert and oriented to person, place, and time. No cranial nerve deficit.   Skin: Skin is warm and dry. He is not diaphoretic. No erythema.   Psychiatric: He has a normal mood and affect. His behavior is normal. Thought content normal.       Recent Labs      08/10/17   2112   WBC  9.3   RBC  6.11*   HEMOGLOBIN  18.0   HEMATOCRIT  49.4   MCV  80.9*   MCH  29.5   MCHC  36.4*   RDW  37.7   PLATELETCT  238   MPV  11.4     Recent Labs      08/10/17   2112   SODIUM  128*   POTASSIUM  2.3*   CHLORIDE  84*   CO2  31   GLUCOSE  113*   BUN  9   CREATININE  0.87   CALCIUM  10.1     Recent Labs      08/10/17   2112   ALTSGPT  10   ASTSGOT  20   ALKPHOSPHAT  77   TBILIRUBIN  1.1   GLUCOSE  113*     Recent Labs      08/10/17   2112   APTT  23.7*   INR  0.89             Lab Results   Component Value Date    TROPONINI 0.02 08/10/2017       Imaging  DX-CHEST-PORTABLE (1 VIEW)   Final Result      No acute cardiopulmonary disease.      CT-HEAD W/O    (Results Pending)     EKG  per my independant read:  QTc: 515, HR: 68, Normal Sinus Rhythm, no ST/T changes     Assessment/Plan     I anticipate this patient will require at least two midnights for appropriate medical management, necessitating inpatient admission.    Dizziness (present on admission)  Assessment & Plan  Etiology unclear  CT head:  PT/OT  Will get MRI brain      Hypokalemia (present on admission)  Assessment & Plan  Replete as needed    Hypertensive urgency (present on admission)  Assessment & Plan  With systolic blood pressure  above 200  Started on amlodipine 10 mg, lisinopril 20 mg  With IV enalapril 1.25 mg every 6 hours when necessary with perimeter  Adjust medication as needed    Hyponatremia (present on admission)  Assessment & Plan  Etiology unclear  CMP in the morning  Will check Urine and serum osmolality    Hyperglycemia (present on admission)  Assessment & Plan  Check A1c      Dependence on nicotine from cigarettes (present on admission)  Assessment & Plan  Spent > 10 minutes on smoking cessation education        Prophylaxis:  sc heparin         This dictation was created using voice recognition software. The accuracy of the dictation is limited to the abilities of the software. I expect there may be some errors of grammar and possibly content.

## 2017-08-11 NOTE — ED NOTES
Stephanie from Lab called with critical result of potassium at 2.3. Critical lab result read back to Stephanie.   Dr. Quesada notified of critical lab result at 2222.  Critical lab result read back by Dr. Quesada.

## 2017-08-11 NOTE — ASSESSMENT & PLAN NOTE
Spent > 10 minutes on smoking cessation education recently    will need ongoing re enforcement-- provide nicotine patch.

## 2017-08-11 NOTE — ASSESSMENT & PLAN NOTE
Improved , likely from diarrhea - now resolved.  oral kcl replacement   Fu labs.  Adequate oral intake- hl ivfs.

## 2017-08-11 NOTE — PROGRESS NOTES
2 RN skin assessment completed with Yoselyn GREEN. Ears red, but blanching; pt wears glasses but no oxygen. Elbows red, dry, flakly, but blanching bilaterally d/t psoriasis per pt. Sacrum red, dry, flaky, nonblanching d/t psoriasis per pt. Wound pictures taken, wound consult ordered, mepilex applied. Feet dry, flaky, calloused bilaterally.

## 2017-08-11 NOTE — ED PROVIDER NOTES
"ED Provider Note    ED Provider Note          CHIEF COMPLAINT  Chief Complaint   Patient presents with   • Near Syncopal   • Other     \"I cant control my legs\"    • Dizziness   • Lightheadedness       HPI  Will Fuentes is a 66 y.o. male who presents to the Emergency Department for complaints of dizziness and lightheadedness earlier today. He said the other day he got very dizzy and lightheaded and felt he was going to pass out. He denied any loss of consciousness. He currently does not have any complaints. He was worried because at the time he felt like he cannot control his legs and was worried that he had a stroke. However he now been able to walk without any difficulty. He denies any slurred speech or weakness. He does have a history of hypertension but he's been off his medications for 3 years now. He normally takes hydrochlorothiazide but has not been taking it. He denies any headache or chest pain. He denies any nausea or vomiting. He denies any injuries.    REVIEW OF SYSTEMS  Review of Systems   Constitutional: Negative for fever and fatigue.   HENT: Negative for congestion.    Eyes: Negative for visual disturbance.   Respiratory: Negative for cough and shortness of breath.    Gastrointestinal: Negative for nausea, vomiting, abdominal pain and diarrhea.   Genitourinary: Negative for difficulty urinating.   Musculoskeletal: Negative for back pain.   Skin: Negative for rash.   Neurological: Negative for headaches.        Currently does not feel dizzy or lightheaded   Psychiatric/Behavioral: Negative for confusion.       PAST MEDICAL HISTORY   has a past medical history of Hypertension; Hypertension; Chronic low back pain; Chronic left shoulder pain; Psoriasis; COPD; Drug-seeking behavior; Breath shortness; Heart burn; Pain; and Psychiatric problem.    SURGICAL HISTORY   has past surgical history that includes other orthopedic surgery; shoulder surgery; and other orthopedic surgery.    SOCIAL " "HISTORY  Social History   Substance Use Topics   • Smoking status: Current Every Day Smoker   • Smokeless tobacco: None      Comment: 2 ppd for 50 yrs   • Alcohol Use: No      History   Drug Use   • Yes     Comment: Prescription  (morphine and norco)       FAMILY HISTORY  No family history on file.    CURRENT MEDICATIONS  Reviewed.  See Encounter Summary.     ALLERGIES  Allergies   Allergen Reactions   • Darvocet [Propoxyphene N-Apap]    • Flexeril [Cyclobenzaprine Hcl]    • Ibuprofen    • Nsaids Swelling     Fluid retention       PHYSICAL EXAM  VITAL SIGNS: /119 mmHg  Pulse 64  Temp(Src) 36.4 °C (97.5 °F)  Resp 19  Ht 1.753 m (5' 9.02\")  Wt 67 kg (147 lb 11.3 oz)  BMI 21.80 kg/m2  SpO2 94%  Physical Exam   Constitutional: He is oriented to person, place, and time. He appears well-developed.   HENT:   Head: Normocephalic and atraumatic.   Eyes: Conjunctivae are normal. Pupils are equal, round, and reactive to light.   Neck: Normal range of motion.   Cardiovascular: Normal rate.    Pulmonary/Chest: Effort normal and breath sounds normal.   Abdominal: Soft. He exhibits no distension. There is no tenderness.   Musculoskeletal: Normal range of motion.   Neurological: He is alert and oriented to person, place, and time.   NIH 0, finger to nose intact, no nystagmus, heel-to-shin intact, steady gait, no truncal ataxia   Skin: Skin is warm. He is not diaphoretic.   Psychiatric: He has a normal mood and affect.           DIAGNOSTIC STUDIES / PROCEDURES     LABS  Results for orders placed or performed during the hospital encounter of 08/10/17   CBC WITH DIFFERENTIAL   Result Value Ref Range    WBC 9.3 4.8 - 10.8 K/uL    RBC 6.11 (H) 4.70 - 6.10 M/uL    Hemoglobin 18.0 14.0 - 18.0 g/dL    Hematocrit 49.4 42.0 - 52.0 %    MCV 80.9 (L) 81.4 - 97.8 fL    MCH 29.5 27.0 - 33.0 pg    MCHC 36.4 (H) 33.7 - 35.3 g/dL    RDW 37.7 35.9 - 50.0 fL    Platelet Count 238 164 - 446 K/uL    MPV 11.4 9.0 - 12.9 fL    " Neutrophils-Polys 63.90 44.00 - 72.00 %    Lymphocytes 23.40 22.00 - 41.00 %    Monocytes 9.80 0.00 - 13.40 %    Eosinophils 1.40 0.00 - 6.90 %    Basophils 1.30 0.00 - 1.80 %    Immature Granulocytes 0.20 0.00 - 0.90 %    Nucleated RBC 0.00 /100 WBC    Neutrophils (Absolute) 5.91 1.82 - 7.42 K/uL    Lymphs (Absolute) 2.16 1.00 - 4.80 K/uL    Monos (Absolute) 0.91 (H) 0.00 - 0.85 K/uL    Eos (Absolute) 0.13 0.00 - 0.51 K/uL    Baso (Absolute) 0.12 0.00 - 0.12 K/uL    Immature Granulocytes (abs) 0.02 0.00 - 0.11 K/uL    NRBC (Absolute) 0.00 K/uL   COMP METABOLIC PANEL   Result Value Ref Range    Sodium 128 (L) 135 - 145 mmol/L    Potassium 2.3 (LL) 3.6 - 5.5 mmol/L    Chloride 84 (L) 96 - 112 mmol/L    Co2 31 20 - 33 mmol/L    Anion Gap 13.0 (H) 0.0 - 11.9    Glucose 113 (H) 65 - 99 mg/dL    Bun 9 8 - 22 mg/dL    Creatinine 0.87 0.50 - 1.40 mg/dL    Calcium 10.1 8.5 - 10.5 mg/dL    AST(SGOT) 20 12 - 45 U/L    ALT(SGPT) 10 2 - 50 U/L    Alkaline Phosphatase 77 30 - 99 U/L    Total Bilirubin 1.1 0.1 - 1.5 mg/dL    Albumin 4.2 3.2 - 4.9 g/dL    Total Protein 8.0 6.0 - 8.2 g/dL    Globulin 3.8 (H) 1.9 - 3.5 g/dL    A-G Ratio 1.1 g/dL   TROPONIN   Result Value Ref Range    Troponin I 0.02 0.00 - 0.04 ng/mL   PRTOTHROMBIN TIME (INR)   Result Value Ref Range    PT 12.3 12.0 - 14.6 sec    INR 0.89 0.87 - 1.13   APTT   Result Value Ref Range    APTT 23.7 (L) 24.7 - 36.0 sec   MAGNESIUM   Result Value Ref Range    Magnesium 2.1 1.5 - 2.5 mg/dL   PHOSPHORUS   Result Value Ref Range    Phosphorus 2.9 2.5 - 4.5 mg/dL   ESTIMATED GFR   Result Value Ref Range    GFR If African American >60 >60 mL/min/1.73 m 2    GFR If Non African American >60 >60 mL/min/1.73 m 2   EKG (NOW)   Result Value Ref Range    Report       [facility]    Test Date:  2017-08-10  Pt Name:    ANSHUL IRELAND              Department: ER  MRN:        [id number]                      Room:       [facility]  Gender:     M                             Technician: [id number]  :        [id number]                   Requested By:ER TRIAGE PROTOCOL  Order #:    [id number]                    Reading MD:    Measurements  Intervals                                Axis  Rate:       68                           P:          68  HI:         152                          QRS:        64  QRSD:       92                           T:          60  QT:         484  QTc:        515    Interpretive Statements  SINUS RHYTHM  BIATRIAL ABNORMALITIES  PROLONGED QT INTERVAL  Compared to ECG 2010 17:03:00  Atrial abnormality now present  Prolonged QT interval now present  Sinus bradycardia no longer present         All labs were reviewed by me.    EKG Time completed    12 Lead EKG interpreted by me to show:  Sinus rhythm   Rate 68  Axis: Normal  Intervals:       [id number]  No ST elevation changes >1mm  Clinical Impression: Prolonged QT but no ST elevations    RADIOLOGY  DX-CHEST-PORTABLE (1 VIEW)   Final Result      No acute cardiopulmonary disease.      CT-HEAD W/O    (Results Pending)     The radiologist's interpretation of all radiological studies have been reviewed by me.    COURSE & MEDICAL DECISION MAKING  Pertinent Labs & Imaging studies reviewed. (See chart for details)    8:59 PM - Patient seen and examined at bedside.     Decision Making:  This is a 66 y.o. year old male who presents with complaints of an episode of dizziness a couple days ago. Patient presented here hypertensive but otherwise asymptomatic. His EKG had a prolonged QT but no ischemic abnormalities. Patient does have a long-standing history of hypertension but is currently been off of his medicines. I gave him a dose of his previous home medicine here as well as some IV fluids. As far as his labs go his troponin was negative and kidney function was also unremarkable. He however is hyponatremic and hypokalemic therefore I'm giving him replacement. Magnesium as well as potassium. He is given a  1 L IV fluid normal saline bolus as well. Because of the concern of his QTC as well as his electronic abnormalities he'll be admitted for monitoring, replacement and telemetry. I do not think he is having a stroke at this time however he will continue to be observed while inpatient.    FINAL IMPRESSION  1. Hypokalemia    2. Essential hypertension

## 2017-08-11 NOTE — PROGRESS NOTES
[facility] Hospitalist Progress Note    Date of Service: 2017    Chief Complaint  66 y.o. Male hx of hypertension, COPD, chronic back, shoulder pain, depression,   admitted 8/10/2017 with dizziness, light headedness, nearly passed out.    Interval Problem Update    Hypertensive sbp 190s , low K and NA.  Plan MRI brain .    Consultants/Specialty  none    Disposition  Home when stable.         Review of Systems   Constitutional: Negative for fever and chills.   HENT: Negative for congestion.    Respiratory: Negative for cough and shortness of breath.    Cardiovascular: Negative for chest pain, palpitations and leg swelling.   Gastrointestinal: Negative for nausea, vomiting and abdominal pain.   Genitourinary: Negative for hematuria and flank pain.   Musculoskeletal: Positive for myalgias and back pain.   Skin: Negative for itching and rash.   Neurological: Positive for dizziness (improved. worsened w exertion ). Negative for tremors, sensory change, speech change, focal weakness and headaches.   Psychiatric/Behavioral: Positive for substance abuse. Negative for hallucinations.      Physical Exam  Laboratory/Imaging   Hemodynamics  Temp (24hrs), Av.7 °C (98 °F), Min:36.3 °C (97.3 °F), Max:37.1 °C (98.8 °F)   Temperature: 36.3 °C (97.3 °F)  Pulse  Av.5  Min: 61  Max: 85 Heart Rate (Monitored): 61  Blood Pressure : 136/75 mmHg, NIBP: (!) 190/107 mmHg      Respiratory      Respiration: 17, Pulse Oximetry: 98 %        RUL Breath Sounds: Clear, RML Breath Sounds: Diminished, RLL Breath Sounds: Diminished, CHACHA Breath Sounds: Clear, LLL Breath Sounds: Diminished    Fluids    Intake/Output Summary (Last 24 hours) at 17 0830  Last data filed at 17 0031   Gross per 24 hour   Intake      0 ml   Output   1600 ml   Net  -1600 ml       Nutrition  Orders Placed This Encounter   Procedures   • Diet Order     Standing Status: Standing      Number of Occurrences: 1      Standing Expiration Date:      Order Specific  Question:  Diet:     Answer:  Regular [1]     Physical Exam   Constitutional: He is oriented to person, place, and time.   Eyes: Conjunctivae and EOM are normal. No scleral icterus.   Neck: Neck supple.   Cardiovascular: Normal rate and regular rhythm.    No murmur heard.  Pulmonary/Chest: No stridor. He has no wheezes. He has no rales.   Abdominal: Soft. Bowel sounds are normal. He exhibits no distension. There is no tenderness.   Musculoskeletal: He exhibits no edema or tenderness.   Neurological: He is alert and oriented to person, place, and time. No cranial nerve deficit. Coordination normal.   Skin: Skin is warm and dry. No erythema.   Psychiatric: He has a normal mood and affect. His behavior is normal.       Recent Labs      08/10/17   2112   WBC  9.3   RBC  6.11*   HEMOGLOBIN  18.0   HEMATOCRIT  49.4   MCV  80.9*   MCH  29.5   MCHC  36.4*   RDW  37.7   PLATELETCT  238   MPV  11.4     Recent Labs      08/10/17   2112   SODIUM  128*   POTASSIUM  2.3*   CHLORIDE  84*   CO2  31   GLUCOSE  113*   BUN  9   CREATININE  0.87   CALCIUM  10.1     Recent Labs      08/10/17   2112   APTT  23.7*   INR  0.89                  Assessment/Plan     Hypokalemia (present on admission)  Assessment & Plan  Oral kcl w IV kcl replacement.   Fu serum k, mag    Hypertensive urgency (present on admission)  Assessment & Plan  Started on amlodipine 10 mg, lisinopril 20 mg  Continue  IV enalapril 1.25 mg every 6 hours prn 180       Dizziness (present on admission)  Assessment & Plan  Etiology unclear- ? sympt hypertension, posterior CVA , BPV   PT/OT  Plan  MRI brain      Hyponatremia (present on admission)  Assessment & Plan  Hypo osmolar   IV ns- oral free water fluid restrict if worsens.  CMP in the morning      Hyperglycemia (present on admission)  Assessment & Plan  Resolved  Fu bs.      Dependence on nicotine from cigarettes (present on admission)  Assessment & Plan  Spent > 10 minutes on smoking cessation education recently     will need ongoing re enforcement.       Labs reviewed, Medications reviewed and Radiology images reviewed  Estrada catheter: No Estrada      DVT Prophylaxis: Heparin

## 2017-08-11 NOTE — ED NOTES
"Pt w/c to Red 8. Pt changed into gown and placed on monitor.  Agree with triage note. Pt c/o intermittent pain in lower back, legs, and shoulders at 8/10. Pt states, \"I think I had a stroke yesterday.\" Pt is AOx4. Pupils PERRLA. Equal bilateral  and leg strength.   ED tech at bedside now for EKG.  Chart up and ready for ERP now.     "

## 2017-08-12 ENCOUNTER — APPOINTMENT (OUTPATIENT)
Dept: RADIOLOGY | Facility: MEDICAL CENTER | Age: 67
DRG: 065 | End: 2017-08-12
Attending: HOSPITALIST
Payer: MEDICARE

## 2017-08-12 LAB
ALBUMIN SERPL BCP-MCNC: 3.2 G/DL (ref 3.2–4.9)
ALBUMIN/GLOB SERPL: 1.1 G/DL
ALP SERPL-CCNC: 54 U/L (ref 30–99)
ALT SERPL-CCNC: 7 U/L (ref 2–50)
ANION GAP SERPL CALC-SCNC: 10 MMOL/L (ref 0–11.9)
AST SERPL-CCNC: 14 U/L (ref 12–45)
BILIRUB SERPL-MCNC: 0.7 MG/DL (ref 0.1–1.5)
BUN SERPL-MCNC: 10 MG/DL (ref 8–22)
CALCIUM SERPL-MCNC: 8.9 MG/DL (ref 8.5–10.5)
CHLORIDE SERPL-SCNC: 97 MMOL/L (ref 96–112)
CO2 SERPL-SCNC: 26 MMOL/L (ref 20–33)
CREAT SERPL-MCNC: 0.84 MG/DL (ref 0.5–1.4)
EKG IMPRESSION: NORMAL
ERYTHROCYTE [DISTWIDTH] IN BLOOD BY AUTOMATED COUNT: 38.9 FL (ref 35.9–50)
GFR SERPL CREATININE-BSD FRML MDRD: >60 ML/MIN/1.73 M 2
GLOBULIN SER CALC-MCNC: 2.8 G/DL (ref 1.9–3.5)
GLUCOSE SERPL-MCNC: 98 MG/DL (ref 65–99)
HCT VFR BLD AUTO: 42.4 % (ref 42–52)
HGB BLD-MCNC: 15.3 G/DL (ref 14–18)
MAGNESIUM SERPL-MCNC: 1.9 MG/DL (ref 1.5–2.5)
MAGNESIUM SERPL-MCNC: 2 MG/DL (ref 1.5–2.5)
MCH RBC QN AUTO: 29.5 PG (ref 27–33)
MCHC RBC AUTO-ENTMCNC: 36.1 G/DL (ref 33.7–35.3)
MCV RBC AUTO: 81.7 FL (ref 81.4–97.8)
PLATELET # BLD AUTO: 189 K/UL (ref 164–446)
PMV BLD AUTO: 11.6 FL (ref 9–12.9)
POTASSIUM SERPL-SCNC: 2.6 MMOL/L (ref 3.6–5.5)
POTASSIUM SERPL-SCNC: 2.7 MMOL/L (ref 3.6–5.5)
POTASSIUM SERPL-SCNC: 3 MMOL/L (ref 3.6–5.5)
PROT SERPL-MCNC: 6 G/DL (ref 6–8.2)
RBC # BLD AUTO: 5.19 M/UL (ref 4.7–6.1)
SODIUM SERPL-SCNC: 133 MMOL/L (ref 135–145)
WBC # BLD AUTO: 7.9 K/UL (ref 4.8–10.8)

## 2017-08-12 PROCEDURE — 99232 SBSQ HOSP IP/OBS MODERATE 35: CPT | Performed by: HOSPITALIST

## 2017-08-12 PROCEDURE — 700111 HCHG RX REV CODE 636 W/ 250 OVERRIDE (IP): Performed by: NURSE PRACTITIONER

## 2017-08-12 PROCEDURE — 700111 HCHG RX REV CODE 636 W/ 250 OVERRIDE (IP): Performed by: HOSPITALIST

## 2017-08-12 PROCEDURE — 770020 HCHG ROOM/CARE - TELE (206)

## 2017-08-12 PROCEDURE — 36415 COLL VENOUS BLD VENIPUNCTURE: CPT

## 2017-08-12 PROCEDURE — 700101 HCHG RX REV CODE 250: Performed by: INTERNAL MEDICINE

## 2017-08-12 PROCEDURE — 84132 ASSAY OF SERUM POTASSIUM: CPT

## 2017-08-12 PROCEDURE — 80053 COMPREHEN METABOLIC PANEL: CPT

## 2017-08-12 PROCEDURE — 700111 HCHG RX REV CODE 636 W/ 250 OVERRIDE (IP): Performed by: INTERNAL MEDICINE

## 2017-08-12 PROCEDURE — A9270 NON-COVERED ITEM OR SERVICE: HCPCS | Performed by: HOSPITALIST

## 2017-08-12 PROCEDURE — 700102 HCHG RX REV CODE 250 W/ 637 OVERRIDE(OP): Performed by: INTERNAL MEDICINE

## 2017-08-12 PROCEDURE — A9270 NON-COVERED ITEM OR SERVICE: HCPCS | Performed by: INTERNAL MEDICINE

## 2017-08-12 PROCEDURE — 700102 HCHG RX REV CODE 250 W/ 637 OVERRIDE(OP): Performed by: HOSPITALIST

## 2017-08-12 PROCEDURE — 83735 ASSAY OF MAGNESIUM: CPT | Mod: 91

## 2017-08-12 PROCEDURE — 85027 COMPLETE CBC AUTOMATED: CPT

## 2017-08-12 RX ORDER — POTASSIUM CHLORIDE 7.45 MG/ML
10 INJECTION INTRAVENOUS
Status: COMPLETED | OUTPATIENT
Start: 2017-08-12 | End: 2017-08-12

## 2017-08-12 RX ORDER — POTASSIUM CHLORIDE 20 MEQ/1
40 TABLET, EXTENDED RELEASE ORAL DAILY
Status: DISCONTINUED | OUTPATIENT
Start: 2017-08-12 | End: 2017-08-14

## 2017-08-12 RX ORDER — POTASSIUM CHLORIDE 20 MEQ/1
40 TABLET, EXTENDED RELEASE ORAL 2 TIMES DAILY
Status: DISCONTINUED | OUTPATIENT
Start: 2017-08-12 | End: 2017-08-12

## 2017-08-12 RX ADMIN — POTASSIUM CHLORIDE AND SODIUM CHLORIDE: 900; 150 INJECTION, SOLUTION INTRAVENOUS at 08:29

## 2017-08-12 RX ADMIN — POTASSIUM CHLORIDE 10 MEQ: 10 INJECTION, SOLUTION INTRAVENOUS at 14:30

## 2017-08-12 RX ADMIN — POTASSIUM CHLORIDE 10 MEQ: 10 INJECTION, SOLUTION INTRAVENOUS at 15:44

## 2017-08-12 RX ADMIN — POTASSIUM CHLORIDE 10 MEQ: 10 INJECTION, SOLUTION INTRAVENOUS at 18:06

## 2017-08-12 RX ADMIN — HYDROCODONE BITARTRATE AND ACETAMINOPHEN 1 TABLET: 10; 325 TABLET ORAL at 21:02

## 2017-08-12 RX ADMIN — HYDROCODONE BITARTRATE AND ACETAMINOPHEN 1 TABLET: 10; 325 TABLET ORAL at 12:45

## 2017-08-12 RX ADMIN — POTASSIUM CHLORIDE 10 MEQ: 10 INJECTION, SOLUTION INTRAVENOUS at 16:56

## 2017-08-12 RX ADMIN — HEPARIN SODIUM 5000 UNITS: 5000 INJECTION, SOLUTION INTRAVENOUS; SUBCUTANEOUS at 14:30

## 2017-08-12 RX ADMIN — HEPARIN SODIUM 5000 UNITS: 5000 INJECTION, SOLUTION INTRAVENOUS; SUBCUTANEOUS at 21:02

## 2017-08-12 RX ADMIN — NICOTINE 21 MG: 21 PATCH, EXTENDED RELEASE TRANSDERMAL at 05:37

## 2017-08-12 RX ADMIN — POTASSIUM CHLORIDE 10 MEQ: 10 INJECTION, SOLUTION INTRAVENOUS at 05:41

## 2017-08-12 RX ADMIN — POTASSIUM CHLORIDE 40 MEQ: 1500 TABLET, EXTENDED RELEASE ORAL at 14:30

## 2017-08-12 RX ADMIN — AMLODIPINE BESYLATE 10 MG: 10 TABLET ORAL at 08:28

## 2017-08-12 RX ADMIN — POTASSIUM CHLORIDE 10 MEQ: 10 INJECTION, SOLUTION INTRAVENOUS at 04:38

## 2017-08-12 RX ADMIN — BUDESONIDE AND FORMOTEROL FUMARATE DIHYDRATE 2 PUFF: 80; 4.5 AEROSOL RESPIRATORY (INHALATION) at 08:28

## 2017-08-12 RX ADMIN — BUDESONIDE AND FORMOTEROL FUMARATE DIHYDRATE 2 PUFF: 80; 4.5 AEROSOL RESPIRATORY (INHALATION) at 21:02

## 2017-08-12 RX ADMIN — HEPARIN SODIUM 5000 UNITS: 5000 INJECTION, SOLUTION INTRAVENOUS; SUBCUTANEOUS at 05:36

## 2017-08-12 RX ADMIN — LISINOPRIL 20 MG: 20 TABLET ORAL at 08:29

## 2017-08-12 ASSESSMENT — ENCOUNTER SYMPTOMS
CHILLS: 0
COUGH: 0
DIZZINESS: 1
PALPITATIONS: 0
SHORTNESS OF BREATH: 0
SPEECH CHANGE: 0
FLANK PAIN: 0
FOCAL WEAKNESS: 0
EYE REDNESS: 0
EYE DISCHARGE: 0
HEADACHES: 0
FEVER: 0
SENSORY CHANGE: 0
VOMITING: 0
NAUSEA: 0
HALLUCINATIONS: 0
TREMORS: 0
MYALGIAS: 1
BACK PAIN: 1
ABDOMINAL PAIN: 0

## 2017-08-12 ASSESSMENT — COGNITIVE AND FUNCTIONAL STATUS - GENERAL
MOBILITY SCORE: 21
SUGGESTED CMS G CODE MODIFIER MOBILITY: CJ
TOILETING: A LITTLE
STANDING UP FROM CHAIR USING ARMS: A LITTLE
CLIMB 3 TO 5 STEPS WITH RAILING: A LITTLE
SUGGESTED CMS G CODE MODIFIER DAILY ACTIVITY: CI
DAILY ACTIVITIY SCORE: 23
WALKING IN HOSPITAL ROOM: A LITTLE

## 2017-08-12 ASSESSMENT — PAIN SCALES - GENERAL
PAINLEVEL_OUTOF10: 0
PAINLEVEL_OUTOF10: 8
PAINLEVEL_OUTOF10: 8
PAINLEVEL_OUTOF10: 4
PAINLEVEL_OUTOF10: 4

## 2017-08-12 ASSESSMENT — LIFESTYLE VARIABLES
DO YOU DRINK ALCOHOL: NO
SUBSTANCE_ABUSE: 1

## 2017-08-12 NOTE — PROGRESS NOTES
Assumed care at 1900, bedside report received from day shift RN. Patient is AOx4 and drowsy. Pt. Is sinus rhythm on the monitor. Initial assessment completed, orders reviewed, call light within reach, bed alarm in use, and hourly rounding in place. POC addressed with patient, no additional questions at this time.

## 2017-08-12 NOTE — CARE PLAN
Problem: Communication  Goal: The ability to communicate needs accurately and effectively will improve  Outcome: PROGRESSING AS EXPECTED  Pt. Is able to express comfort needs effectively. Pt. Denies pain or discomfort at this time and is able to vocalize wants and needs.

## 2017-08-12 NOTE — PROGRESS NOTES
Verified with pharmacist that both the potassium rider and the NS with 20 KCL can run concurrently in PIV.

## 2017-08-12 NOTE — WOUND TEAM
Patient's sacrum is intact. Pink and blanching inside the gluteal cleft, with a line of dry skin down the cleft. Area of psoriasis on right medial buttock towards the anus. Patient has no advanced wound care needs at this time.

## 2017-08-12 NOTE — PROGRESS NOTES
[facility]ist Progress Note    Date of Service: 2017    Chief Complaint  66 y.o. Male hx of hypertension, COPD, chronic back, shoulder pain, depression,   admitted 8/10/2017 with dizziness, light headedness, nearly passed out.    Interval Problem Update    Hypokalemia worsened- given k ryders and oral kcl overnight. Hyponatremic .  Dizziness improved.  Pending MRI brain. Sinus occasional jxnal w BBB.    Consultants/Specialty  none    Disposition  Home when stable.         Review of Systems   Constitutional: Negative for fever and chills.   HENT: Negative for congestion.    Eyes: Negative for discharge and redness.   Respiratory: Negative for cough and shortness of breath.    Cardiovascular: Negative for chest pain, palpitations and leg swelling.   Gastrointestinal: Negative for nausea, vomiting and abdominal pain.   Genitourinary: Negative for hematuria and flank pain.   Musculoskeletal: Positive for myalgias and back pain.        Chronic    Skin: Negative for itching and rash.   Neurological: Positive for dizziness (signif  better, ). Negative for tremors, sensory change, speech change, focal weakness and headaches.   Psychiatric/Behavioral: Positive for substance abuse. Negative for hallucinations.      Physical Exam  Laboratory/Imaging   Hemodynamics  Temp (24hrs), Av.3 °C (97.4 °F), Min:36.1 °C (96.9 °F), Max:36.6 °C (97.9 °F)   Temperature: 36.6 °C (97.9 °F)  Pulse  Av  Min: 60  Max: 85    Blood Pressure : 137/78 mmHg      Respiratory      Respiration: 16, Pulse Oximetry: 95 %        RUL Breath Sounds: Clear;Expiratory Wheezes, RML Breath Sounds: Diminished, RLL Breath Sounds: Diminished, CHACHA Breath Sounds: Clear;Expiratory Wheezes, LLL Breath Sounds: Diminished    Fluids    Intake/Output Summary (Last 24 hours) at 17 0655  Last data filed at 17 0600   Gross per 24 hour   Intake   1420 ml   Output   1225 ml   Net    195 ml       Nutrition  Orders Placed This Encounter   Procedures    • Diet Order     Standing Status: Standing      Number of Occurrences: 1      Standing Expiration Date:      Order Specific Question:  Diet:     Answer:  Regular [1]     Physical Exam   Constitutional: He is oriented to person, place, and time. No distress.   Eyes: Conjunctivae and EOM are normal. No scleral icterus.   Neck: Neck supple.   Cardiovascular: Normal rate and regular rhythm.    No murmur heard.  Pulmonary/Chest: No stridor. He has no wheezes. He has no rales.   Abdominal: Soft. Bowel sounds are normal. He exhibits no distension. There is no tenderness.   Musculoskeletal: He exhibits no edema or tenderness.   Neurological: He is alert and oriented to person, place, and time. No cranial nerve deficit. Coordination normal.   Skin: Skin is warm and dry. He is not diaphoretic. No erythema.   Psychiatric: He has a normal mood and affect. His behavior is normal.       Recent Labs      08/10/17   2112  08/12/17   0210   WBC  9.3  7.9   RBC  6.11*  5.19   HEMOGLOBIN  18.0  15.3   HEMATOCRIT  49.4  42.4   MCV  80.9*  81.7   MCH  29.5  29.5   MCHC  36.4*  36.1*   RDW  37.7  38.9   PLATELETCT  238  189   MPV  11.4  11.6     Recent Labs      08/11/17   1115  08/11/17 2118  08/12/17   0210   SODIUM  128*  131*  133*   POTASSIUM  2.6*  3.0*  2.7*   CHLORIDE  92*  95*  97   CO2  29  27  26   GLUCOSE  93  122*  98   BUN  9  13  10   CREATININE  0.78  0.98  0.84   CALCIUM  9.0  9.4  8.9     Recent Labs      08/10/17   2112   APTT  23.7*   INR  0.89                  Assessment/Plan     Hypokalemia (present on admission)  Assessment & Plan  Worsened   IV add oral kcl replacement   Fu serum k, mag today   K in IV maintenance fluids     Hypertensive urgency (present on admission)  Assessment & Plan  Started on amlodipine 10 mg, lisinopril 20 mg  Continue  IV enalapril 1.25 mg every 6 hours prn 180       Dizziness (present on admission)  Assessment & Plan  Etiology unclear- ? sympt hypertension, posterior CVA , BPV -  improved symptoms  PT/OT  FU  MRI brain      Hyponatremia (present on admission)  Assessment & Plan  Hypo osmolar - improved   IV ns   Fu bmp      Hyperglycemia (present on admission)  Assessment & Plan  Resolved  Fu bs.      Dependence on nicotine from cigarettes (present on admission)  Assessment & Plan  Spent > 10 minutes on smoking cessation education recently    will need ongoing re enforcement-- provide nicotine patch.      Labs reviewed, Medications reviewed and Radiology images reviewed  Estrada catheter: No Estrada      DVT Prophylaxis: Heparin

## 2017-08-12 NOTE — PROGRESS NOTES
Call placed to Dr Hill's answering service and message left with  Katy requesting for a call back regarding Critical K of 2.6    Waiting for Dr. Hill to return my call    Call returned before 30 minutes and Dr. Hill ordered K replacement.    MRI brain without contrast ordered for pt. MRI pre-screening form filled out and faxed to MRI.

## 2017-08-12 NOTE — PROGRESS NOTES
Mary from Lab called with critical result of Potassium at 2.7. Critical lab result read back to Mary. ABILIO Vasqeuz notified of critical lab result at 0400.  Critical lab result read back by ABILIO Vasquez. New orders received.

## 2017-08-12 NOTE — RESPIRATORY CARE
COPD EDUCATION by COPD CLINICAL EDUCATOR  8/12/2017 at 6:22 AM by Cintia Quinn     Patient reviewed by COPD education team. Patient does not qualify for COPD program.

## 2017-08-12 NOTE — DIETARY
Nutrition: Day 1 of admit.  67 yo male admitted with hypertensive urgency and hypokalemia.  History of COPD.  He is noted to have had poor po intake and weight loss of unknown amount PTA.  He is currently on a regular diet taking % of meals.  BMI 21.8 and albumin 4.2 on admit.      Recommendations:  1) encourage intake of meals and supplements  2) document intake of all meals and supplements as % taken in ADL's to provide interdisciplinary communication across all shifts   3) monitor daily weights

## 2017-08-12 NOTE — PROGRESS NOTES
Received bedside shift report from night RN, Ana Maria.  Pt is AOx4.  Discussed POC and goal for the day with patient and he verbalized understanding.  Eduated pt on white board and call light.  Bed locked and in lowest position with bilateral bedside rails up.  Will resume care and continue to monitor.

## 2017-08-12 NOTE — CARE PLAN
Problem: Safety  Goal: Will remain free from injury  Outcome: PROGRESSING AS EXPECTED  Pt had no falls or injuries this shift.    Problem: Venous Thromboembolism (VTW)/Deep Vein Thrombosis (DVT) Prevention:  Goal: Patient will participate in Venous Thrombosis (VTE)/Deep Vein Thrombosis (DVT)Prevention Measures  Outcome: PROGRESSING AS EXPECTED  Pt received SQ heparin as ordered and had no s/s of DVT this shift.    Problem: Bowel/Gastric:  Goal: Normal bowel function is maintained or improved  Outcome: PROGRESSING AS EXPECTED  Pt had 2 large BM's this shift.

## 2017-08-12 NOTE — PROGRESS NOTES
Dilma Stratton Fall Risk Assessment:     Last Known Fall: Within the last month  Mobility: Dizziness/generalized weakness  Medications: Cardiovascular or central nervous system meds  Mental Status/LOC/Awareness: Awake, alert, and oriented to date, place, and person  Toileting Needs: Use of assistive device (Bedside commode, bedpan, urinal)  Volume/Electrolyte Status: Use of IV fluids/tube feeds, Low blood sugar/electrolyte imbalances  Communication/Sensory: Visual (Glasses)/hearing deficit  Behavior: Appropriate behavior  Dilma Stratton Fall Risk Total: 13  Fall Risk Level: MODERATE RISK    Universal Fall Precautions:  call light/belongings in reach, bed in low position and locked, siderails up x 2, use non-slip footwear, adequate lighting, clutter free and spill free environment, educate on level of risk, educate to call for assistance    Fall Risk Level Interventions:    TRIAL (TELE 8, NEURO, MED NUBIA 5) Moderate Fall Risk Interventions  Place yellow fall risk ID band on patient: verified  Provide patient/family education based on risk assessment : completed  Educate patient/family to call staff for assistance when getting out of bed: completed  Place fall precaution signage outside patient door: verified  Utilize bed/chair fall alarm: verified     Patient Specific Interventions:     Medication: review medications with patient and family and limit combination of prn medications  Mental Status/LOC/Awareness: reinforce falls education, check on patient hourly, utilize bed/chair fall alarm and reinforce the use of call light  Toileting: provide frquent toileting, instruct male patients prone to dizziness to void while sitting, instruct patient/family on the use of grab bars, instruct patient/family on the need to call for assistance when toileting and do not leave patient unattended in bathroom/refer to toileting scripting  Volume/Electrolyte Status: ensure patient remains hydrated, monitor abnormal lab values and  ensure IV fluids are appropriate  Communication/Sensory: update plan of care on whiteboard, ensure proper positioning when transferrng/ambulating and ensure patient has glasses/contacts and hearing aids/dentures  Behavioral: encourage patient to voice feelings, administer medication as ordered and instruct/reinforce fall program rationale  Mobility: dangle prior to standing, utilize bed/chair fall alarm, ensure bed is locked and in lowest position, provide appropriate assistive device and instruct patient to exit bed on their strongest side

## 2017-08-12 NOTE — PROGRESS NOTES
Monitor Summary:   SR 61-73 with rare PVC's and x2 runs of 4 beats of accel idio    .16/.10/.60

## 2017-08-12 NOTE — PROGRESS NOTES
Monitor Summary     SR 61-77 with (R)PAC, (R)PVC, and (O)Junctional Rhythm with BBB and prolonged QT     .16/.08/.54

## 2017-08-13 PROBLEM — I63.81 THALAMIC INFARCT, ACUTE (HCC): Status: ACTIVE | Noted: 2017-08-13

## 2017-08-13 LAB
ANION GAP SERPL CALC-SCNC: 6 MMOL/L (ref 0–11.9)
ANION GAP SERPL CALC-SCNC: 8 MMOL/L (ref 0–11.9)
BUN SERPL-MCNC: 4 MG/DL (ref 8–22)
BUN SERPL-MCNC: 7 MG/DL (ref 8–22)
CALCIUM SERPL-MCNC: 8.7 MG/DL (ref 8.5–10.5)
CALCIUM SERPL-MCNC: 8.9 MG/DL (ref 8.5–10.5)
CHLORIDE SERPL-SCNC: 100 MMOL/L (ref 96–112)
CHLORIDE SERPL-SCNC: 102 MMOL/L (ref 96–112)
CO2 SERPL-SCNC: 24 MMOL/L (ref 20–33)
CO2 SERPL-SCNC: 24 MMOL/L (ref 20–33)
CREAT SERPL-MCNC: 0.64 MG/DL (ref 0.5–1.4)
CREAT SERPL-MCNC: 0.76 MG/DL (ref 0.5–1.4)
GFR SERPL CREATININE-BSD FRML MDRD: >60 ML/MIN/1.73 M 2
GFR SERPL CREATININE-BSD FRML MDRD: >60 ML/MIN/1.73 M 2
GLUCOSE SERPL-MCNC: 96 MG/DL (ref 65–99)
GLUCOSE SERPL-MCNC: 99 MG/DL (ref 65–99)
POTASSIUM SERPL-SCNC: 3 MMOL/L (ref 3.6–5.5)
POTASSIUM SERPL-SCNC: 3.4 MMOL/L (ref 3.6–5.5)
SODIUM SERPL-SCNC: 132 MMOL/L (ref 135–145)
SODIUM SERPL-SCNC: 132 MMOL/L (ref 135–145)

## 2017-08-13 PROCEDURE — 700102 HCHG RX REV CODE 250 W/ 637 OVERRIDE(OP): Performed by: INTERNAL MEDICINE

## 2017-08-13 PROCEDURE — 700111 HCHG RX REV CODE 636 W/ 250 OVERRIDE (IP): Performed by: INTERNAL MEDICINE

## 2017-08-13 PROCEDURE — 770020 HCHG ROOM/CARE - TELE (206)

## 2017-08-13 PROCEDURE — A9270 NON-COVERED ITEM OR SERVICE: HCPCS | Performed by: HOSPITALIST

## 2017-08-13 PROCEDURE — 700102 HCHG RX REV CODE 250 W/ 637 OVERRIDE(OP): Performed by: HOSPITALIST

## 2017-08-13 PROCEDURE — 99232 SBSQ HOSP IP/OBS MODERATE 35: CPT | Performed by: HOSPITALIST

## 2017-08-13 PROCEDURE — 700101 HCHG RX REV CODE 250: Performed by: INTERNAL MEDICINE

## 2017-08-13 PROCEDURE — 70551 MRI BRAIN STEM W/O DYE: CPT

## 2017-08-13 PROCEDURE — A9270 NON-COVERED ITEM OR SERVICE: HCPCS | Performed by: INTERNAL MEDICINE

## 2017-08-13 PROCEDURE — 80048 BASIC METABOLIC PNL TOTAL CA: CPT

## 2017-08-13 PROCEDURE — 36415 COLL VENOUS BLD VENIPUNCTURE: CPT

## 2017-08-13 RX ORDER — LISINOPRIL 20 MG/1
20 TABLET ORAL DAILY
Qty: 30 TAB | Refills: 1 | Status: SHIPPED | OUTPATIENT
Start: 2017-08-13 | End: 2018-04-11

## 2017-08-13 RX ORDER — LOPERAMIDE HYDROCHLORIDE 2 MG/1
2 CAPSULE ORAL ONCE
Status: COMPLETED | OUTPATIENT
Start: 2017-08-13 | End: 2017-08-13

## 2017-08-13 RX ORDER — POTASSIUM CHLORIDE 20 MEQ/1
40 TABLET, EXTENDED RELEASE ORAL DAILY
Qty: 10 TAB | Refills: 0 | Status: SHIPPED | OUTPATIENT
Start: 2017-08-13 | End: 2017-08-18

## 2017-08-13 RX ORDER — MORPHINE SULFATE 60 MG/1
60 TABLET, FILM COATED, EXTENDED RELEASE ORAL EVERY 12 HOURS
Status: DISCONTINUED | OUTPATIENT
Start: 2017-08-13 | End: 2017-08-14

## 2017-08-13 RX ORDER — AMLODIPINE BESYLATE 10 MG/1
10 TABLET ORAL DAILY
Qty: 30 TAB | Refills: 1 | Status: SHIPPED | OUTPATIENT
Start: 2017-08-13 | End: 2019-01-01

## 2017-08-13 RX ADMIN — HEPARIN SODIUM 5000 UNITS: 5000 INJECTION, SOLUTION INTRAVENOUS; SUBCUTANEOUS at 14:49

## 2017-08-13 RX ADMIN — HYDROCODONE BITARTRATE AND ACETAMINOPHEN 1 TABLET: 10; 325 TABLET ORAL at 14:51

## 2017-08-13 RX ADMIN — HYDROCODONE BITARTRATE AND ACETAMINOPHEN 1 TABLET: 10; 325 TABLET ORAL at 21:00

## 2017-08-13 RX ADMIN — BUDESONIDE AND FORMOTEROL FUMARATE DIHYDRATE 2 PUFF: 80; 4.5 AEROSOL RESPIRATORY (INHALATION) at 07:36

## 2017-08-13 RX ADMIN — HEPARIN SODIUM 5000 UNITS: 5000 INJECTION, SOLUTION INTRAVENOUS; SUBCUTANEOUS at 21:00

## 2017-08-13 RX ADMIN — POTASSIUM CHLORIDE 40 MEQ: 1500 TABLET, EXTENDED RELEASE ORAL at 07:36

## 2017-08-13 RX ADMIN — MORPHINE SULFATE 60 MG: 60 TABLET, EXTENDED RELEASE ORAL at 22:12

## 2017-08-13 RX ADMIN — HEPARIN SODIUM 5000 UNITS: 5000 INJECTION, SOLUTION INTRAVENOUS; SUBCUTANEOUS at 05:14

## 2017-08-13 RX ADMIN — POTASSIUM CHLORIDE AND SODIUM CHLORIDE: 900; 150 INJECTION, SOLUTION INTRAVENOUS at 01:37

## 2017-08-13 RX ADMIN — LOPERAMIDE HYDROCHLORIDE 2 MG: 2 CAPSULE ORAL at 14:49

## 2017-08-13 RX ADMIN — LISINOPRIL 20 MG: 20 TABLET ORAL at 07:36

## 2017-08-13 RX ADMIN — AMLODIPINE BESYLATE 10 MG: 10 TABLET ORAL at 07:36

## 2017-08-13 RX ADMIN — HYDROCODONE BITARTRATE AND ACETAMINOPHEN 1 TABLET: 10; 325 TABLET ORAL at 07:36

## 2017-08-13 RX ADMIN — BUDESONIDE AND FORMOTEROL FUMARATE DIHYDRATE 2 PUFF: 80; 4.5 AEROSOL RESPIRATORY (INHALATION) at 21:05

## 2017-08-13 RX ADMIN — NICOTINE 21 MG: 21 PATCH, EXTENDED RELEASE TRANSDERMAL at 05:14

## 2017-08-13 ASSESSMENT — COGNITIVE AND FUNCTIONAL STATUS - GENERAL
SUGGESTED CMS G CODE MODIFIER DAILY ACTIVITY: CI
STANDING UP FROM CHAIR USING ARMS: A LITTLE
WALKING IN HOSPITAL ROOM: A LITTLE
MOBILITY SCORE: 21
SUGGESTED CMS G CODE MODIFIER MOBILITY: CJ
TOILETING: A LITTLE
DAILY ACTIVITIY SCORE: 23
CLIMB 3 TO 5 STEPS WITH RAILING: A LITTLE

## 2017-08-13 ASSESSMENT — PAIN SCALES - GENERAL
PAINLEVEL_OUTOF10: 3
PAINLEVEL_OUTOF10: 8
PAINLEVEL_OUTOF10: 7
PAINLEVEL_OUTOF10: 3
PAINLEVEL_OUTOF10: 5

## 2017-08-13 ASSESSMENT — ENCOUNTER SYMPTOMS
BACK PAIN: 1
FOCAL WEAKNESS: 0
SHORTNESS OF BREATH: 0
FEVER: 0
ABDOMINAL PAIN: 0
EYE REDNESS: 0
HALLUCINATIONS: 0
COUGH: 0
SENSORY CHANGE: 0
SPEECH CHANGE: 0
VOMITING: 0
EYE DISCHARGE: 0
MYALGIAS: 1
DIZZINESS: 1
FLANK PAIN: 0
CHILLS: 0

## 2017-08-13 ASSESSMENT — LIFESTYLE VARIABLES
DO YOU DRINK ALCOHOL: NO
SUBSTANCE_ABUSE: 1
EVER_SMOKED: YES

## 2017-08-13 NOTE — PROGRESS NOTES
Assumed care at 1900, bedside report received from day shift RN. Patient is AOX4 with no signs of distress. Pt. Is sinus rhythm on the monitor. Initial assessment completed, orders reviewed, call light within reach, bed alarm in use, and hourly rounding in place. POC addressed with patient, no additional questions at this time.

## 2017-08-13 NOTE — CARE PLAN
Problem: Bowel/Gastric:  Goal: Normal bowel function is maintained or improved  Outcome: PROGRESSING SLOWER THAN EXPECTED  Patient's diarrhea will be relieved with Imodium prior to discharge.    Problem: Discharge Barriers/Planning  Goal: Patient’s continuum of care needs will be met  Outcome: PROGRESSING AS EXPECTED  Patient will be discharged once MRI results are read by MD.

## 2017-08-13 NOTE — CARE PLAN
Problem: Safety  Goal: Will remain free from falls  Outcome: PROGRESSING AS EXPECTED  Patient will be free from falls during this admission and understands falls risk education.    Problem: Pain Management  Goal: Pain level will decrease to patient’s comfort goal  Outcome: PROGRESSING AS EXPECTED  Patient will understand pain assessment and pain medication regimen.

## 2017-08-13 NOTE — PROGRESS NOTES
ABILIO Vasquez notified of Patient's potassium of 3.0. No further orders received. Lab to recheck potassium with 0300 labs.

## 2017-08-13 NOTE — PROGRESS NOTES
[facility] Hospitalist Progress Note    Date of Service: 2017    Chief Complaint  66 y.o. Male hx of hypertension, COPD, chronic back, shoulder pain, depression,   admitted 8/10/2017 with dizziness, light headedness, nearly passed out.    Interval Problem Update    Hypokalemia improved. Had frequent diarrhea yesterday- now resolved. MRI brain  result pending.     Consultants/Specialty  none    Disposition  Home when stable.         Review of Systems   Constitutional: Negative for fever and chills.   Eyes: Negative for discharge and redness.   Respiratory: Negative for cough and shortness of breath.    Cardiovascular: Negative for chest pain and leg swelling.   Gastrointestinal: Negative for vomiting and abdominal pain.   Genitourinary: Negative for hematuria and flank pain.   Musculoskeletal: Positive for myalgias and back pain.        Chronic    Neurological: Positive for dizziness (signif  better, ). Negative for sensory change, speech change and focal weakness.   Psychiatric/Behavioral: Positive for substance abuse. Negative for hallucinations.      Physical Exam  Laboratory/Imaging   Hemodynamics  Temp (24hrs), Av.6 °C (97.9 °F), Min:36.2 °C (97.2 °F), Max:37.1 °C (98.8 °F)   Temperature: 36.7 °C (98.1 °F)  Pulse  Av.4  Min: 60  Max: 85    Blood Pressure : 151/88 mmHg      Respiratory      Respiration: 18, Pulse Oximetry: 94 %        RUL Breath Sounds: Clear, RML Breath Sounds: Diminished, RLL Breath Sounds: Diminished, CHACHA Breath Sounds: Clear, LLL Breath Sounds: Diminished    Fluids    Intake/Output Summary (Last 24 hours) at 17 0947  Last data filed at 17 0600   Gross per 24 hour   Intake    150 ml   Output   1090 ml   Net   -940 ml       Nutrition  Orders Placed This Encounter   Procedures   • Diet Order     Standing Status: Standing      Number of Occurrences: 1      Standing Expiration Date:      Order Specific Question:  Diet:     Answer:  Regular [1]     Physical Exam    Constitutional: He is oriented to person, place, and time. No distress.   Eyes: EOM are normal. No scleral icterus.   Neck: Neck supple.   Cardiovascular: Normal rate and regular rhythm.    No murmur heard.  Pulmonary/Chest: No stridor. He has no wheezes. He has no rales.   Abdominal: Soft. Bowel sounds are normal. He exhibits no distension. There is no tenderness.   Musculoskeletal: He exhibits no edema or tenderness.   Neurological: He is alert and oriented to person, place, and time. No cranial nerve deficit. Coordination normal.   Skin: Skin is warm and dry. He is not diaphoretic. No erythema.       Recent Labs      08/10/17   2112  08/12/17   0210   WBC  9.3  7.9   RBC  6.11*  5.19   HEMOGLOBIN  18.0  15.3   HEMATOCRIT  49.4  42.4   MCV  80.9*  81.7   MCH  29.5  29.5   MCHC  36.4*  36.1*   RDW  37.7  38.9   PLATELETCT  238  189   MPV  11.4  11.6     Recent Labs      08/11/17 2118  08/12/17   0210  08/12/17   1336  08/12/17   1816  08/13/17   0242   SODIUM  131*  133*   --    --   132*   POTASSIUM  3.0*  2.7*  2.6*  3.0*  3.0*   CHLORIDE  95*  97   --    --   100   CO2  27  26   --    --   24   GLUCOSE  122*  98   --    --   99   BUN  13  10   --    --   7*   CREATININE  0.98  0.84   --    --   0.76   CALCIUM  9.4  8.9   --    --   8.9     Recent Labs      08/10/17   2112   APTT  23.7*   INR  0.89                  Assessment/Plan     Hypokalemia (present on admission)  Assessment & Plan  Improved , likely from diarrhea - now resolved.  oral kcl replacement   Fu labs.  Adequate oral intake- hl ivfs.    Hypertensive urgency (present on admission)  Assessment & Plan  Started on amlodipine 10 mg, lisinopril 20 mg- improved bp control   Continue  IV enalapril 1.25 mg every 6 hours prn 180       Dizziness (present on admission)  Assessment & Plan  Etiology unclear- ? sympt hypertension, posterior CVA , BPV - improved symptoms  FU  MRI brain  Unsteady- states he usually uses a Cane at home.      Hyponatremia  (present on admission)  Assessment & Plan  Hypo osmolar - improved - mild asymptomatic.         Hyperglycemia (present on admission)  Assessment & Plan  Resolved  Fu bs.      Dependence on nicotine from cigarettes (present on admission)  Assessment & Plan  Spent > 10 minutes on smoking cessation education recently    will need ongoing re enforcement-- provide nicotine patch.      Labs reviewed, Medications reviewed and Radiology images reviewed  Estrada catheter: No Estrada      DVT Prophylaxis: Heparin

## 2017-08-13 NOTE — CARE PLAN
Problem: Venous Thromboembolism (VTW)/Deep Vein Thrombosis (DVT) Prevention:  Goal: Patient will participate in Venous Thrombosis (VTE)/Deep Vein Thrombosis (DVT)Prevention Measures  Outcome: PROGRESSING AS EXPECTED  Patient is being anticoagulated with unfractionated heparin. Pt. Is x1 assist and walks occasionally.

## 2017-08-13 NOTE — PROGRESS NOTES
Call placed to Dr. Hill to notify him of most recent K of 3.4 and to request for anti-diarrhea medication.

## 2017-08-14 PROBLEM — I63.9 CVA (CEREBRAL VASCULAR ACCIDENT) (HCC): Status: ACTIVE | Noted: 2017-08-14

## 2017-08-14 LAB
LV EJECT FRACT  99904: 55
LV EJECT FRACT MOD 2C 99903: 61.46
LV EJECT FRACT MOD 4C 99902: 53.18
LV EJECT FRACT MOD BP 99901: 57.07

## 2017-08-14 PROCEDURE — 93306 TTE W/DOPPLER COMPLETE: CPT | Mod: 26 | Performed by: INTERNAL MEDICINE

## 2017-08-14 PROCEDURE — 770020 HCHG ROOM/CARE - TELE (206)

## 2017-08-14 PROCEDURE — 93306 TTE W/DOPPLER COMPLETE: CPT

## 2017-08-14 PROCEDURE — 700111 HCHG RX REV CODE 636 W/ 250 OVERRIDE (IP): Performed by: INTERNAL MEDICINE

## 2017-08-14 PROCEDURE — 700102 HCHG RX REV CODE 250 W/ 637 OVERRIDE(OP): Performed by: INTERNAL MEDICINE

## 2017-08-14 PROCEDURE — 99232 SBSQ HOSP IP/OBS MODERATE 35: CPT | Performed by: HOSPITALIST

## 2017-08-14 PROCEDURE — A9270 NON-COVERED ITEM OR SERVICE: HCPCS | Performed by: HOSPITALIST

## 2017-08-14 PROCEDURE — A9270 NON-COVERED ITEM OR SERVICE: HCPCS | Performed by: INTERNAL MEDICINE

## 2017-08-14 PROCEDURE — 700102 HCHG RX REV CODE 250 W/ 637 OVERRIDE(OP): Performed by: HOSPITALIST

## 2017-08-14 RX ORDER — ATORVASTATIN CALCIUM 80 MG/1
80 TABLET, FILM COATED ORAL
Status: DISCONTINUED | OUTPATIENT
Start: 2017-08-14 | End: 2017-08-16 | Stop reason: HOSPADM

## 2017-08-14 RX ORDER — POTASSIUM CHLORIDE 20 MEQ/1
20 TABLET, EXTENDED RELEASE ORAL DAILY
Status: DISCONTINUED | OUTPATIENT
Start: 2017-08-14 | End: 2017-08-16 | Stop reason: HOSPADM

## 2017-08-14 RX ORDER — CLOPIDOGREL BISULFATE 75 MG/1
75 TABLET ORAL DAILY
Status: DISCONTINUED | OUTPATIENT
Start: 2017-08-14 | End: 2017-08-16 | Stop reason: HOSPADM

## 2017-08-14 RX ORDER — MORPHINE SULFATE 100 MG/1
100 TABLET ORAL 3 TIMES DAILY
Status: DISCONTINUED | OUTPATIENT
Start: 2017-08-14 | End: 2017-08-16 | Stop reason: HOSPADM

## 2017-08-14 RX ORDER — LABETALOL HYDROCHLORIDE 5 MG/ML
10 INJECTION, SOLUTION INTRAVENOUS EVERY 4 HOURS PRN
Status: DISCONTINUED | OUTPATIENT
Start: 2017-08-14 | End: 2017-08-16 | Stop reason: HOSPADM

## 2017-08-14 RX ORDER — ZOLPIDEM TARTRATE 5 MG/1
10 TABLET ORAL NIGHTLY PRN
Status: DISCONTINUED | OUTPATIENT
Start: 2017-08-14 | End: 2017-08-16 | Stop reason: HOSPADM

## 2017-08-14 RX ORDER — HYDRALAZINE HYDROCHLORIDE 20 MG/ML
10 INJECTION INTRAMUSCULAR; INTRAVENOUS
Status: DISCONTINUED | OUTPATIENT
Start: 2017-08-14 | End: 2017-08-16 | Stop reason: HOSPADM

## 2017-08-14 RX ADMIN — NICOTINE 21 MG: 21 PATCH, EXTENDED RELEASE TRANSDERMAL at 06:33

## 2017-08-14 RX ADMIN — HYDROCODONE BITARTRATE AND ACETAMINOPHEN 1 TABLET: 10; 325 TABLET ORAL at 06:33

## 2017-08-14 RX ADMIN — HYDROCODONE BITARTRATE AND ACETAMINOPHEN 1 TABLET: 10; 325 TABLET ORAL at 14:07

## 2017-08-14 RX ADMIN — HEPARIN SODIUM 5000 UNITS: 5000 INJECTION, SOLUTION INTRAVENOUS; SUBCUTANEOUS at 06:33

## 2017-08-14 RX ADMIN — CLOPIDOGREL 75 MG: 75 TABLET, FILM COATED ORAL at 13:58

## 2017-08-14 RX ADMIN — HEPARIN SODIUM 5000 UNITS: 5000 INJECTION, SOLUTION INTRAVENOUS; SUBCUTANEOUS at 13:59

## 2017-08-14 RX ADMIN — POTASSIUM CHLORIDE 40 MEQ: 1500 TABLET, EXTENDED RELEASE ORAL at 08:28

## 2017-08-14 RX ADMIN — MORPHINE SULFATE 50 MG: 100 TABLET, EXTENDED RELEASE ORAL at 21:47

## 2017-08-14 RX ADMIN — AMLODIPINE BESYLATE 10 MG: 10 TABLET ORAL at 08:27

## 2017-08-14 RX ADMIN — BUDESONIDE AND FORMOTEROL FUMARATE DIHYDRATE 2 PUFF: 80; 4.5 AEROSOL RESPIRATORY (INHALATION) at 08:28

## 2017-08-14 RX ADMIN — ATORVASTATIN CALCIUM 80 MG: 80 TABLET, FILM COATED ORAL at 21:36

## 2017-08-14 RX ADMIN — HEPARIN SODIUM 5000 UNITS: 5000 INJECTION, SOLUTION INTRAVENOUS; SUBCUTANEOUS at 21:36

## 2017-08-14 RX ADMIN — LISINOPRIL 20 MG: 20 TABLET ORAL at 08:27

## 2017-08-14 RX ADMIN — POTASSIUM CHLORIDE 20 MEQ: 1500 TABLET, EXTENDED RELEASE ORAL at 13:59

## 2017-08-14 RX ADMIN — BUDESONIDE AND FORMOTEROL FUMARATE DIHYDRATE 2 PUFF: 80; 4.5 AEROSOL RESPIRATORY (INHALATION) at 21:47

## 2017-08-14 RX ADMIN — ZOLPIDEM TARTRATE 10 MG: 5 TABLET, FILM COATED ORAL at 21:36

## 2017-08-14 RX ADMIN — MORPHINE SULFATE 60 MG: 60 TABLET, EXTENDED RELEASE ORAL at 08:28

## 2017-08-14 ASSESSMENT — ENCOUNTER SYMPTOMS
VOMITING: 0
FLANK PAIN: 0
ABDOMINAL PAIN: 0
SHORTNESS OF BREATH: 0
FEVER: 0
EYE DISCHARGE: 0
SPEECH CHANGE: 0
COUGH: 0
HALLUCINATIONS: 0
STRIDOR: 0
FOCAL WEAKNESS: 0
CHILLS: 0
DIZZINESS: 1
EYE REDNESS: 0
BACK PAIN: 1
SENSORY CHANGE: 0
MYALGIAS: 1

## 2017-08-14 ASSESSMENT — PAIN SCALES - GENERAL
PAINLEVEL_OUTOF10: 9
PAINLEVEL_OUTOF10: 9
PAINLEVEL_OUTOF10: 8
PAINLEVEL_OUTOF10: 8

## 2017-08-14 ASSESSMENT — LIFESTYLE VARIABLES: SUBSTANCE_ABUSE: 1

## 2017-08-14 NOTE — PROGRESS NOTES
Patient is being discharged home pending MRI results. IV has been removed. Tele monitor will be removed. Patient states he understands discharge education and instructions.

## 2017-08-14 NOTE — PROGRESS NOTES
Bedside report and 12-hour chart check completed per policy and procedure. Assuming care of pt at this time.

## 2017-08-14 NOTE — ASSESSMENT & PLAN NOTE
Mri findings of Small, right greater than left, acute thalamic infarcts.  Previously on asa daily - failure for stroke prevention- start plavix, high dose statin.   Ok echo, carotid   PT / OT   Stroke order set  Pt declines Rehab

## 2017-08-14 NOTE — DISCHARGE INSTRUCTIONS
Discharge Instructions    Discharged to home by car with friend. Discharged via wheelchair, hospital escort: Yes.  Special equipment needed: Not Applicable    Be sure to schedule a follow-up appointment with your primary care doctor or any specialists as instructed.     Discharge Plan:   Diet Plan: Discussed  Activity Level: Discussed  Smoking Cessation Offered: Patient Counseled  Confirmed Follow up Appointment: Patient to Call and Schedule Appointment (Please call to schedule an appointment to follow up with your Primary Care Physician in 1-2 weeks.)  Confirmed Symptoms Management: Discussed  Medication Reconciliation Updated: Yes  Influenza Vaccine Indication: Patient Refuses    I understand that a diet low in cholesterol, fat, and sodium is recommended for good health. Unless I have been given specific instructions below for another diet, I accept this instruction as my diet prescription.   Other diet: Regular Diet As Tolerated    Special Instructions: Take your medication as prescribed and follow up with your primary care physician in 1-2 weeks.    · Is patient discharged on Warfarin / Coumadin?   No     · Is patient Post Blood Transfusion?  No    Depression / Suicide Risk    As you are discharged from this [facility] facility, it is important to learn how to keep safe from harming yourself.    Recognize the warning signs:  · Abrupt changes in personality, positive or negative- including increase in energy   · Giving away possessions  · Change in eating patterns- significant weight changes-  positive or negative  · Change in sleeping patterns- unable to sleep or sleeping all the time   · Unwillingness or inability to communicate  · Depression  · Unusual sadness, discouragement and loneliness  · Talk of wanting to die  · Neglect of personal appearance   · Rebelliousness- reckless behavior  · Withdrawal from people/activities they love  · Confusion- inability to concentrate     If you or a loved one observes  any of these behaviors or has concerns about self-harm, here's what you can do:  · Talk about it- your feelings and reasons for harming yourself  · Remove any means that you might use to hurt yourself (examples: pills, rope, extension cords, firearm)  · Get professional help from the community (Mental Health, Substance Abuse, psychological counseling)  · Do not be alone:Call your Safe Contact- someone whom you trust who will be there for you.  · Call your local CRISIS HOTLINE 909-9484 or 429-073-8570  · Call your local Children's Mobile Crisis Response Team [place] (520) 871-6906 or www.newScale  · Call the toll free National Suicide Prevention Hotlines   · National Suicide Prevention Lifeline 201-870-GFXY (3011)  · National Hope Line Network 800-SUICIDE (415-8513)

## 2017-08-14 NOTE — PROGRESS NOTES
Report received. Pt care assumed. Assessment performed. Pt AOx4. Pt laying supine in bed. Pt c/o 9/10 L/R leg/back pain and no signs of distress. Medicating per MAR. Bed in low, locked position. Bed alarm on. Call light within reach. Treaded socks on pt.  Hourly rounding in place.

## 2017-08-14 NOTE — PROGRESS NOTES
Called MRI reading room to request for MRI of brain which was done last night to be read, because it is pending patient's discharge home. MRI tech stated that the patient's MRI of the brain was not ended until today at 1704. She stated that there was not a radiologist here to read the MRI and that it would have to be read tomorrow.    Call was placed to hospitalist nurse practitioner, Segundo, and she was notified of the situation. She agreed that the discharge will have to be held until MRI is read tomorrow.    Patient and his family have been notified and are aware.

## 2017-08-14 NOTE — PROGRESS NOTES
[facility]ist Progress Note    Date of Service: 2017    Chief Complaint  66 y.o. Male hx of hypertension, COPD, chronic back, shoulder pain, depression,   admitted 8/10/2017 with dizziness, light headedness, nearly passed out.    Interval Problem Update    MRI findings of bilateral acute CVAs .  Dizziness improved.  States he as always been unsteady w use of cane.     Consultants/Specialty  none    Disposition  Home when stable.         Review of Systems   Constitutional: Negative for fever and chills.   HENT: Negative for congestion.    Eyes: Negative for discharge and redness.   Respiratory: Negative for cough, shortness of breath and stridor.    Cardiovascular: Negative for chest pain and leg swelling.   Gastrointestinal: Negative for vomiting and abdominal pain.   Genitourinary: Negative for hematuria and flank pain.   Musculoskeletal: Positive for myalgias and back pain.        Chronic    Skin: Negative for itching and rash.   Neurological: Positive for dizziness (signif  better, ). Negative for sensory change, speech change and focal weakness.   Psychiatric/Behavioral: Positive for substance abuse. Negative for hallucinations.      Physical Exam  Laboratory/Imaging   Hemodynamics  Temp (24hrs), Av.7 °C (98 °F), Min:36.5 °C (97.7 °F), Max:36.9 °C (98.4 °F)   Temperature: 36.5 °C (97.7 °F)  Pulse  Av.4  Min: 60  Max: 85    Blood Pressure : 144/84 mmHg      Respiratory      Respiration: 16, Pulse Oximetry: 93 %             Fluids    Intake/Output Summary (Last 24 hours) at 17 0810  Last data filed at 17 0629   Gross per 24 hour   Intake      0 ml   Output   1400 ml   Net  -1400 ml       Nutrition  Orders Placed This Encounter   Procedures   • Diet Order     Standing Status: Standing      Number of Occurrences: 1      Standing Expiration Date:      Order Specific Question:  Diet:     Answer:  Regular [1]     Physical Exam   Constitutional: He is oriented to person, place, and time.  No distress.   Eyes: EOM are normal. No scleral icterus.   Neck: Neck supple.   Cardiovascular: Normal rate and regular rhythm.    No murmur heard.  Pulmonary/Chest: No stridor. He has no wheezes. He has no rales.   Abdominal: Soft. Bowel sounds are normal. He exhibits no distension. There is no tenderness.   Musculoskeletal: He exhibits no edema or tenderness.   Neurological: He is alert and oriented to person, place, and time. No cranial nerve deficit. Coordination normal.   No focal weakness   Skin: Skin is warm and dry. He is not diaphoretic. No erythema.   Psychiatric: He has a normal mood and affect. His behavior is normal.       Recent Labs      08/12/17   0210   WBC  7.9   RBC  5.19   HEMOGLOBIN  15.3   HEMATOCRIT  42.4   MCV  81.7   MCH  29.5   MCHC  36.1*   RDW  38.9   PLATELETCT  189   MPV  11.6     Recent Labs      08/12/17   0210   08/12/17   1816  08/13/17   0242  08/13/17   1015   SODIUM  133*   --    --   132*  132*   POTASSIUM  2.7*   < >  3.0*  3.0*  3.4*   CHLORIDE  97   --    --   100  102   CO2  26   --    --   24  24   GLUCOSE  98   --    --   99  96   BUN  10   --    --   7*  4*   CREATININE  0.84   --    --   0.76  0.64   CALCIUM  8.9   --    --   8.9  8.7    < > = values in this interval not displayed.                      Assessment/Plan     * CVA (cerebral vascular accident) (CMS-HCC)  Assessment & Plan  Mri findings of Small, right greater than left, acute thalamic infarcts.  Previously on asa daily - failure for stroke prevention- start plavix, high dose statin.   Check echo, carotid us for embolic source.   PT / OT   Stroke order set .    Thalamic infarct, acute (CMS-HCC)- Bilateral (present on admission)  Assessment & Plan  As above.    Hypokalemia (present on admission)  Assessment & Plan  Improved , likely from diarrhea - now resolved.  oral kcl replacement   Fu labs.  Adequate oral intake- hl ivfs.    Hypertensive urgency (present on admission)  Assessment & Plan  Started on  amlodipine 10 mg, lisinopril 20 mg- improved bp control   Continue  IV enalapril 1.25 mg every 6 hours prn 180       Hyponatremia (present on admission)  Assessment & Plan  Hypo osmolar - improved - mild asymptomatic.         Dizziness (present on admission)  Assessment & Plan  Bilateral thalamic infarcts R>L on MRI  Unsteady- states he usually uses a Cane at home.      Hyperglycemia (present on admission)  Assessment & Plan  Resolved  Fu bs.      Dependence on nicotine from cigarettes (present on admission)  Assessment & Plan  Spent > 10 minutes on smoking cessation education recently    will need ongoing re enforcement-- provide nicotine patch.      Labs reviewed, Medications reviewed and Radiology images reviewed  Estrada catheter: No Estrada      DVT Prophylaxis: Heparin

## 2017-08-15 LAB
ANION GAP SERPL CALC-SCNC: 6 MMOL/L (ref 0–11.9)
BUN SERPL-MCNC: 15 MG/DL (ref 8–22)
CALCIUM SERPL-MCNC: 9.3 MG/DL (ref 8.5–10.5)
CHLORIDE SERPL-SCNC: 101 MMOL/L (ref 96–112)
CHOLEST SERPL-MCNC: 144 MG/DL (ref 100–199)
CO2 SERPL-SCNC: 23 MMOL/L (ref 20–33)
CREAT SERPL-MCNC: 0.91 MG/DL (ref 0.5–1.4)
GFR SERPL CREATININE-BSD FRML MDRD: >60 ML/MIN/1.73 M 2
GLUCOSE SERPL-MCNC: 88 MG/DL (ref 65–99)
HDLC SERPL-MCNC: 46 MG/DL
LDLC SERPL CALC-MCNC: 75 MG/DL
POTASSIUM SERPL-SCNC: 4.4 MMOL/L (ref 3.6–5.5)
SODIUM SERPL-SCNC: 130 MMOL/L (ref 135–145)
TRIGL SERPL-MCNC: 117 MG/DL (ref 0–149)

## 2017-08-15 PROCEDURE — A9270 NON-COVERED ITEM OR SERVICE: HCPCS | Performed by: INTERNAL MEDICINE

## 2017-08-15 PROCEDURE — 80061 LIPID PANEL: CPT

## 2017-08-15 PROCEDURE — A9270 NON-COVERED ITEM OR SERVICE: HCPCS | Performed by: HOSPITALIST

## 2017-08-15 PROCEDURE — 99232 SBSQ HOSP IP/OBS MODERATE 35: CPT | Performed by: HOSPITALIST

## 2017-08-15 PROCEDURE — G8988 SELF CARE GOAL STATUS: HCPCS | Mod: CI

## 2017-08-15 PROCEDURE — 97165 OT EVAL LOW COMPLEX 30 MIN: CPT

## 2017-08-15 PROCEDURE — 700111 HCHG RX REV CODE 636 W/ 250 OVERRIDE (IP): Performed by: INTERNAL MEDICINE

## 2017-08-15 PROCEDURE — 80048 BASIC METABOLIC PNL TOTAL CA: CPT

## 2017-08-15 PROCEDURE — G8987 SELF CARE CURRENT STATUS: HCPCS | Mod: CJ

## 2017-08-15 PROCEDURE — 700102 HCHG RX REV CODE 250 W/ 637 OVERRIDE(OP): Performed by: HOSPITALIST

## 2017-08-15 PROCEDURE — 700102 HCHG RX REV CODE 250 W/ 637 OVERRIDE(OP): Performed by: INTERNAL MEDICINE

## 2017-08-15 PROCEDURE — 36415 COLL VENOUS BLD VENIPUNCTURE: CPT

## 2017-08-15 PROCEDURE — 770020 HCHG ROOM/CARE - TELE (206)

## 2017-08-15 RX ADMIN — ATORVASTATIN CALCIUM 80 MG: 80 TABLET, FILM COATED ORAL at 22:36

## 2017-08-15 RX ADMIN — HEPARIN SODIUM 5000 UNITS: 5000 INJECTION, SOLUTION INTRAVENOUS; SUBCUTANEOUS at 05:31

## 2017-08-15 RX ADMIN — POTASSIUM CHLORIDE 20 MEQ: 1500 TABLET, EXTENDED RELEASE ORAL at 11:14

## 2017-08-15 RX ADMIN — MORPHINE SULFATE 100 MG: 100 TABLET, EXTENDED RELEASE ORAL at 22:36

## 2017-08-15 RX ADMIN — NICOTINE 21 MG: 21 PATCH, EXTENDED RELEASE TRANSDERMAL at 05:31

## 2017-08-15 RX ADMIN — BUDESONIDE AND FORMOTEROL FUMARATE DIHYDRATE 2 PUFF: 80; 4.5 AEROSOL RESPIRATORY (INHALATION) at 22:36

## 2017-08-15 RX ADMIN — BUDESONIDE AND FORMOTEROL FUMARATE DIHYDRATE 2 PUFF: 80; 4.5 AEROSOL RESPIRATORY (INHALATION) at 11:13

## 2017-08-15 RX ADMIN — MORPHINE SULFATE 100 MG: 100 TABLET, EXTENDED RELEASE ORAL at 11:14

## 2017-08-15 RX ADMIN — HYDROCODONE BITARTRATE AND ACETAMINOPHEN 1 TABLET: 10; 325 TABLET ORAL at 18:06

## 2017-08-15 RX ADMIN — MORPHINE SULFATE 100 MG: 100 TABLET, EXTENDED RELEASE ORAL at 14:51

## 2017-08-15 RX ADMIN — CLOPIDOGREL 75 MG: 75 TABLET, FILM COATED ORAL at 11:13

## 2017-08-15 RX ADMIN — HEPARIN SODIUM 5000 UNITS: 5000 INJECTION, SOLUTION INTRAVENOUS; SUBCUTANEOUS at 22:36

## 2017-08-15 RX ADMIN — HEPARIN SODIUM 5000 UNITS: 5000 INJECTION, SOLUTION INTRAVENOUS; SUBCUTANEOUS at 14:51

## 2017-08-15 ASSESSMENT — COGNITIVE AND FUNCTIONAL STATUS - GENERAL
HELP NEEDED FOR BATHING: A LITTLE
TOILETING: A LITTLE
SUGGESTED CMS G CODE MODIFIER DAILY ACTIVITY: CJ
DRESSING REGULAR LOWER BODY CLOTHING: A LITTLE
PERSONAL GROOMING: A LITTLE
DAILY ACTIVITIY SCORE: 20

## 2017-08-15 ASSESSMENT — ENCOUNTER SYMPTOMS
STRIDOR: 0
FEVER: 0
SPEECH CHANGE: 0
DIZZINESS: 1
BACK PAIN: 1
SHORTNESS OF BREATH: 0
EYE REDNESS: 0
FOCAL WEAKNESS: 0
HALLUCINATIONS: 0
ABDOMINAL PAIN: 0
SENSORY CHANGE: 0
FLANK PAIN: 0
VOMITING: 0
MYALGIAS: 1
EYE DISCHARGE: 0
COUGH: 0
CHILLS: 0

## 2017-08-15 ASSESSMENT — PAIN SCALES - GENERAL
PAINLEVEL_OUTOF10: 8
PAINLEVEL_OUTOF10: 6
PAINLEVEL_OUTOF10: 6
PAINLEVEL_OUTOF10: 8
PAINLEVEL_OUTOF10: 7

## 2017-08-15 ASSESSMENT — LIFESTYLE VARIABLES: SUBSTANCE_ABUSE: 1

## 2017-08-15 ASSESSMENT — ACTIVITIES OF DAILY LIVING (ADL): TOILETING: INDEPENDENT

## 2017-08-15 NOTE — PROGRESS NOTES
Report received. Pt care assumed. Assessment performed. Pt AOx4. Pt laying supine in bed. Pt c/o 8/10 back pain and no signs of distress. Medicating per MAR. Bed in low, locked position. Bed alarm on. Call light within reach. Treaded socks on pt.  Hourly rounding in place.

## 2017-08-15 NOTE — PROGRESS NOTES
Dilma Stratton Fall Risk Assessment:     Last Known Fall: Within the last month  Mobility: Immobilized/requires assist of one person  Medications: Cardiovascular and central nervous system meds  Mental Status/LOC/Awareness: Awake, alert, and oriented to date, place, and person  Toileting Needs: Use of assistive device (Bedside commode, bedpan, urinal)  Volume/Electrolyte Status: No problems  Communication/Sensory: Visual (Glasses)/hearing deficit  Behavior: Appropriate behavior  Dilma Stratton Fall Risk Total: 13  Fall Risk Level: MODERATE RISK    Universal Fall Precautions:  call light/belongings in reach, bed in low position and locked, wheelchairs and assistive devices out of sight, use non-slip footwear, siderails up x 2, clutter free and spill free environment, adequate lighting, educate on level of risk, educate to call for assistance    Fall Risk Level Interventions:    TRIAL (TELE 8, NEURO, MED NUBIA 5) Moderate Fall Risk Interventions  Place yellow fall risk ID band on patient: verified  Provide patient/family education based on risk assessment : completed  Educate patient/family to call staff for assistance when getting out of bed: completed  Place fall precaution signage outside patient door: completed  Utilize bed/chair fall alarm: completedTRIAL (TELE 8, NEURO, [employer] NUBIA 5) High Fall Risk Interventions  Place yellow fall risk ID band on patient: completed  Provide patient/family education based on risk assessment: completed  Educate patient/family to call staff for assistance when getting out of bed: completed  Place fall precaution signage outside patient door: completed  Place patient in room close to nursing station: completed  Utilize bed/chair fall alarm: completed  Notify charge of high risk for huddle: completed    Patient Specific Interventions:     Medication: review medications with patient and family and limit combination of prn medications  Mental Status/LOC/Awareness: reinforce falls  education, check on patient hourly, utilize bed/chair fall alarm and reinforce the use of call light  Toileting: provide frquent toileting, monitor intake and output/use of appropriate interventions and instruct patient/family on the need to call for assistance when toileting  Volume/Electrolyte Status: ensure patient remains hydrated, administer medications as ordered for nausea and vomiting and monitor abnormal lab values  Communication/Sensory: update plan of care on whiteboard, ensure proper positioning when transferrng/ambulating and ensure patient has glasses/contacts and hearing aids/dentures  Behavioral: encourage patient to voice feelings, engage patient in daily activities, administer medication as ordered, instruct/reinforce fall program rationale and use appropriate de-escalation techniques  Mobility: dangle prior to standing, utilize bed/chair fall alarm, ensure bed is locked and in lowest position and provide appropriate assistive device

## 2017-08-15 NOTE — CARE PLAN
Problem: Pain Management  Goal: Pain level will decrease to patient’s comfort goal  Intervention: Follow pain managment plan developed in collaboration with patient and Interdisciplinary Team  Medicating per MAR.

## 2017-08-15 NOTE — THERAPY
"Occupational Therapy Evaluation completed.   Functional Status:  Pt seen today for OT eval. Pt pleasant and cooperative, odd affect at times, and impulsive needs cues to slow down. Pt requiring CGA for donning underwear/socks. CGA for functional mobility with no AD - pt reports he uses SPC at home. Pt completed grooming in stance at sink with SBA. Pt's BUE AROM/strength/sensation WDL. R fine motor impaired slightly by tremors, edu on how to compensate. Pt limited by impaired balance and fatigue which impacts independence in ADLs and functional mobility.  Plan of Care: Will benefit from Occupational Therapy 2 times per week  Discharge Recommendations:  Equipment: Will Continue to Assess for Equipment Needs. Post-acute therapy likely Discharge to home with outpatient or home health for additional skilled therapy services.    See \"Rehab Therapy-Acute\" Patient Summary Report for complete documentation.    "

## 2017-08-15 NOTE — CARE PLAN
Problem: Communication  Goal: The ability to communicate needs accurately and effectively will improve  Intervention: Educate patient and significant other/support system about the plan of care, procedures, treatments, medications and allow for questions  Bedside report completed. Patient educated on plan of care, call light, and communication board. Patient verbalizes understanding.           Problem: Safety  Goal: Will remain free from injury  Outcome: PROGRESSING AS EXPECTED  Bed in low position.  Treaded socks on patient.  Call light within reach.  Saline locked.  Bedrails closest to bathroom down.  Patient educated to call for assistance.

## 2017-08-15 NOTE — PROGRESS NOTES
Dilma Stratton Fall Risk Assessment:     Last Known Fall: Within the last month  Mobility: Immobilized/requires assist of one person  Medications: Cardiovascular and central nervous system meds  Mental Status/LOC/Awareness: Awake, alert, and oriented to date, place, and person  Toileting Needs: Use of assistive device (Bedside commode, bedpan, urinal)  Volume/Electrolyte Status: No problems  Communication/Sensory: Visual (Glasses)/hearing deficit  Behavior: Appropriate behavior  Dilma Stratton Fall Risk Total: 13  Fall Risk Level: MODERATE RISK    Universal Fall Precautions:  call light/belongings in reach, bed in low position and locked, wheelchairs and assistive devices out of sight, siderails up x 2, adequate lighting, use non-slip footwear, clutter free and spill free environment, educate on level of risk, educate to call for assistance    Fall Risk Level Interventions:    TRIAL (TELE 8, NEURO, MED NUBIA 5) Moderate Fall Risk Interventions  Place yellow fall risk ID band on patient: completed  Provide patient/family education based on risk assessment : completed  Educate patient/family to call staff for assistance when getting out of bed: completed  Place fall precaution signage outside patient door: completed  Utilize bed/chair fall alarm: completedTRIAL (TELE 8, NEURO, [employer] NUBIA 5) High Fall Risk Interventions  Place yellow fall risk ID band on patient: completed  Provide patient/family education based on risk assessment: completed  Educate patient/family to call staff for assistance when getting out of bed: completed  Place fall precaution signage outside patient door: completed  Place patient in room close to nursing station: completed  Utilize bed/chair fall alarm: completed  Notify charge of high risk for huddle: completed    Patient Specific Interventions:     Medication: review medications with patient and family and limit combination of prn medications  Mental Status/LOC/Awareness: reinforce falls  education, check on patient hourly, utilize bed/chair fall alarm and reinforce the use of call light  Toileting: toilet prior to giving pain medications  Volume/Electrolyte Status: ensure patient remains hydrated and monitor abnormal lab values  Communication/Sensory: update plan of care on whiteboard  Behavioral: instruct/reinforce fall program rationale  Mobility: schedule physical activity throughout the day, provide comfort measures during transport, dangle prior to standing, utilize bed/chair fall alarm, ensure bed is locked and in lowest position, provide appropriate assistive device, instruct patient to exit bed on their strongest side and collaborate with doctor for possible PT/OT consult

## 2017-08-15 NOTE — PROGRESS NOTES
Patient became confused and agitated post administration of Ambien. Prior to administering Ambien patient states that he normally takes Ambien. When patient become more agitated re-asked if patient had taken Ambien before and he stated that he actually had not. A and 0 only to self. Patient removing monitor box despite education and distraction measures. Attempted lap belt without success. Paged Dr. Silver and received new orders for bilateral wrist restraints.

## 2017-08-15 NOTE — PROGRESS NOTES
Received report from day shift RN. Twelve hour chart check completed. Patient assessed. Patient states tingling and numbness in hands. States baseline.  Patient A and 0 X 4.  Bed alarm is on.   Patient states that pain is a 9 out of 10.  Patient educated on plan of care for the evening including medications per MAR, monitoring vital signs, pain control, and rest. Patient educated to call for assistance. Patient verbalizes understanding.

## 2017-08-15 NOTE — PROGRESS NOTES
Dilma Stratton Fall Risk Assessment:     Last Known Fall: Within the last month  Mobility: Immobilized/requires assist of one person  Medications: Cardiovascular and central nervous system meds  Mental Status/LOC/Awareness: Awake, alert, and oriented to date, place, and person  Toileting Needs: Use of assistive device (Bedside commode, bedpan, urinal)  Volume/Electrolyte Status: No problems  Communication/Sensory: Visual (Glasses)/hearing deficit  Behavior: Appropriate behavior  Dilma Stratton Fall Risk Total: 13  Fall Risk Level: MODERATE RISK    Universal Fall Precautions:  call light/belongings in reach, bed in low position and locked, wheelchairs and assistive devices out of sight, siderails up x 2, adequate lighting, use non-slip footwear, clutter free and spill free environment, educate on level of risk, educate to call for assistance    Fall Risk Level Interventions:    TRIAL (TELE 8, NEURO, MED NUBIA 5) Moderate Fall Risk Interventions  Place yellow fall risk ID band on patient: completed  Provide patient/family education based on risk assessment : completed  Educate patient/family to call staff for assistance when getting out of bed: completed  Place fall precaution signage outside patient door: completed  Utilize bed/chair fall alarm: completedTRIAL (TELE 8, NEURO, [employer] NUBIA 5) High Fall Risk Interventions  Place yellow fall risk ID band on patient: completed  Provide patient/family education based on risk assessment: completed  Educate patient/family to call staff for assistance when getting out of bed: completed  Place fall precaution signage outside patient door: completed  Place patient in room close to nursing station: completed  Utilize bed/chair fall alarm: completed  Notify charge of high risk for huddle: completed    Patient Specific Interventions:     Medication: review medications with patient and family and limit combination of prn medications  Mental Status/LOC/Awareness: reinforce falls  education, check on patient hourly, utilize bed/chair fall alarm and reinforce the use of call light  Toileting: toilet prior to giving pain medications  Volume/Electrolyte Status: ensure patient remains hydrated and monitor abnormal lab values  Communication/Sensory: update plan of care on whiteboard  Behavioral: instruct/reinforce fall program rationale  Mobility: schedule physical activity throughout the day, provide comfort measures during transport, dangle prior to standing, utilize bed/chair fall alarm, ensure bed is locked and in lowest position, provide appropriate assistive device, instruct patient to exit bed on their strongest side and collaborate with doctor for possible PT/OT consult

## 2017-08-16 VITALS
OXYGEN SATURATION: 95 % | TEMPERATURE: 98.1 F | HEART RATE: 66 BPM | WEIGHT: 152.34 LBS | BODY MASS INDEX: 22.56 KG/M2 | RESPIRATION RATE: 16 BRPM | HEIGHT: 69 IN | SYSTOLIC BLOOD PRESSURE: 136 MMHG | DIASTOLIC BLOOD PRESSURE: 77 MMHG

## 2017-08-16 LAB
ALBUMIN SERPL BCP-MCNC: 3.4 G/DL (ref 3.2–4.9)
ALBUMIN/GLOB SERPL: 1.1 G/DL
ALP SERPL-CCNC: 54 U/L (ref 30–99)
ALT SERPL-CCNC: 15 U/L (ref 2–50)
ANION GAP SERPL CALC-SCNC: 8 MMOL/L (ref 0–11.9)
AST SERPL-CCNC: 24 U/L (ref 12–45)
BILIRUB SERPL-MCNC: 0.6 MG/DL (ref 0.1–1.5)
BUN SERPL-MCNC: 16 MG/DL (ref 8–22)
CALCIUM SERPL-MCNC: 9.1 MG/DL (ref 8.5–10.5)
CHLORIDE SERPL-SCNC: 99 MMOL/L (ref 96–112)
CO2 SERPL-SCNC: 25 MMOL/L (ref 20–33)
CREAT SERPL-MCNC: 1.05 MG/DL (ref 0.5–1.4)
ERYTHROCYTE [DISTWIDTH] IN BLOOD BY AUTOMATED COUNT: 47 FL (ref 35.9–50)
GFR SERPL CREATININE-BSD FRML MDRD: >60 ML/MIN/1.73 M 2
GLOBULIN SER CALC-MCNC: 3 G/DL (ref 1.9–3.5)
GLUCOSE SERPL-MCNC: 79 MG/DL (ref 65–99)
HCT VFR BLD AUTO: 44.5 % (ref 42–52)
HGB BLD-MCNC: 15.3 G/DL (ref 14–18)
MAGNESIUM SERPL-MCNC: 2 MG/DL (ref 1.5–2.5)
MCH RBC QN AUTO: 30.5 PG (ref 27–33)
MCHC RBC AUTO-ENTMCNC: 34.4 G/DL (ref 33.7–35.3)
MCV RBC AUTO: 88.8 FL (ref 81.4–97.8)
PHOSPHATE SERPL-MCNC: 4.6 MG/DL (ref 2.5–4.5)
PLATELET # BLD AUTO: 181 K/UL (ref 164–446)
PMV BLD AUTO: 11.9 FL (ref 9–12.9)
POTASSIUM SERPL-SCNC: 4.5 MMOL/L (ref 3.6–5.5)
PROT SERPL-MCNC: 6.4 G/DL (ref 6–8.2)
RBC # BLD AUTO: 5.01 M/UL (ref 4.7–6.1)
SODIUM SERPL-SCNC: 132 MMOL/L (ref 135–145)
WBC # BLD AUTO: 7 K/UL (ref 4.8–10.8)

## 2017-08-16 PROCEDURE — 36415 COLL VENOUS BLD VENIPUNCTURE: CPT

## 2017-08-16 PROCEDURE — 700102 HCHG RX REV CODE 250 W/ 637 OVERRIDE(OP): Performed by: INTERNAL MEDICINE

## 2017-08-16 PROCEDURE — 93880 EXTRACRANIAL BILAT STUDY: CPT

## 2017-08-16 PROCEDURE — 85027 COMPLETE CBC AUTOMATED: CPT

## 2017-08-16 PROCEDURE — 700111 HCHG RX REV CODE 636 W/ 250 OVERRIDE (IP): Performed by: INTERNAL MEDICINE

## 2017-08-16 PROCEDURE — G8979 MOBILITY GOAL STATUS: HCPCS | Mod: CI

## 2017-08-16 PROCEDURE — 97161 PT EVAL LOW COMPLEX 20 MIN: CPT

## 2017-08-16 PROCEDURE — 99239 HOSP IP/OBS DSCHRG MGMT >30: CPT | Performed by: HOSPITALIST

## 2017-08-16 PROCEDURE — A9270 NON-COVERED ITEM OR SERVICE: HCPCS | Performed by: INTERNAL MEDICINE

## 2017-08-16 PROCEDURE — G8980 MOBILITY D/C STATUS: HCPCS | Mod: CI

## 2017-08-16 PROCEDURE — G8978 MOBILITY CURRENT STATUS: HCPCS | Mod: CI

## 2017-08-16 PROCEDURE — A9270 NON-COVERED ITEM OR SERVICE: HCPCS | Performed by: HOSPITALIST

## 2017-08-16 PROCEDURE — 93880 EXTRACRANIAL BILAT STUDY: CPT | Mod: 26 | Performed by: SURGERY

## 2017-08-16 PROCEDURE — 83735 ASSAY OF MAGNESIUM: CPT

## 2017-08-16 PROCEDURE — 80053 COMPREHEN METABOLIC PANEL: CPT

## 2017-08-16 PROCEDURE — 700102 HCHG RX REV CODE 250 W/ 637 OVERRIDE(OP): Performed by: HOSPITALIST

## 2017-08-16 PROCEDURE — 84100 ASSAY OF PHOSPHORUS: CPT

## 2017-08-16 RX ORDER — CLOPIDOGREL BISULFATE 75 MG/1
75 TABLET ORAL DAILY
Qty: 30 TAB | Refills: 2 | Status: SHIPPED | OUTPATIENT
Start: 2017-08-16 | End: 2019-01-01

## 2017-08-16 RX ORDER — ATORVASTATIN CALCIUM 80 MG/1
40 TABLET, FILM COATED ORAL EVERY EVENING
Qty: 15 TAB | Refills: 2 | Status: SHIPPED | OUTPATIENT
Start: 2017-08-16 | End: 2017-09-15

## 2017-08-16 RX ADMIN — HEPARIN SODIUM 5000 UNITS: 5000 INJECTION, SOLUTION INTRAVENOUS; SUBCUTANEOUS at 14:07

## 2017-08-16 RX ADMIN — MORPHINE SULFATE 100 MG: 100 TABLET, EXTENDED RELEASE ORAL at 09:16

## 2017-08-16 RX ADMIN — POTASSIUM CHLORIDE 20 MEQ: 1500 TABLET, EXTENDED RELEASE ORAL at 09:16

## 2017-08-16 RX ADMIN — NICOTINE 21 MG: 21 PATCH, EXTENDED RELEASE TRANSDERMAL at 05:46

## 2017-08-16 RX ADMIN — CLOPIDOGREL 75 MG: 75 TABLET, FILM COATED ORAL at 09:16

## 2017-08-16 RX ADMIN — HYDROCODONE BITARTRATE AND ACETAMINOPHEN 1 TABLET: 10; 325 TABLET ORAL at 05:51

## 2017-08-16 RX ADMIN — BUDESONIDE AND FORMOTEROL FUMARATE DIHYDRATE 2 PUFF: 80; 4.5 AEROSOL RESPIRATORY (INHALATION) at 09:16

## 2017-08-16 RX ADMIN — HEPARIN SODIUM 5000 UNITS: 5000 INJECTION, SOLUTION INTRAVENOUS; SUBCUTANEOUS at 05:45

## 2017-08-16 RX ADMIN — MORPHINE SULFATE 100 MG: 100 TABLET, EXTENDED RELEASE ORAL at 14:07

## 2017-08-16 ASSESSMENT — ENCOUNTER SYMPTOMS
BACK PAIN: 1
FOCAL WEAKNESS: 0
ABDOMINAL PAIN: 0
SENSORY CHANGE: 0
EYE DISCHARGE: 0
HALLUCINATIONS: 0
FLANK PAIN: 0
DIZZINESS: 1
CHILLS: 0
COUGH: 0
FEVER: 0
EYE REDNESS: 0
VOMITING: 0
STRIDOR: 0
SHORTNESS OF BREATH: 0
SPEECH CHANGE: 0
MYALGIAS: 1

## 2017-08-16 ASSESSMENT — COGNITIVE AND FUNCTIONAL STATUS - GENERAL
CLIMB 3 TO 5 STEPS WITH RAILING: A LITTLE
SUGGESTED CMS G CODE MODIFIER MOBILITY: CI
MOBILITY SCORE: 23

## 2017-08-16 ASSESSMENT — GAIT ASSESSMENTS
DEVIATION: STEP TO
DISTANCE (FEET): 200
GAIT LEVEL OF ASSIST: STAND BY ASSIST

## 2017-08-16 ASSESSMENT — LIFESTYLE VARIABLES: SUBSTANCE_ABUSE: 1

## 2017-08-16 ASSESSMENT — PAIN SCALES - GENERAL
PAINLEVEL_OUTOF10: 7
PAINLEVEL_OUTOF10: 6

## 2017-08-16 NOTE — DISCHARGE PLANNING
Transitional Care Navigator:    Met with the patient at the bedside.  Pt is agreeable to home health, and wishes to continue with [facility].  Choice form completed and faxed; JENNIFER pandey.

## 2017-08-16 NOTE — PROGRESS NOTES
Dilma Stratton Fall Risk Assessment:     Last Known Fall: Within the last month  Mobility: Immobilized/requires assist of one person  Medications: Cardiovascular and central nervous system meds  Mental Status/LOC/Awareness: Awake, alert, and oriented to date, place, and person  Toileting Needs: Use of assistive device (Bedside commode, bedpan, urinal)  Volume/Electrolyte Status: No problems  Communication/Sensory: Visual (Glasses)/hearing deficit  Behavior: Appropriate behavior  Dilma Stratton Fall Risk Total: 13  Fall Risk Level: MODERATE RISK    Universal Fall Precautions:  call light/belongings in reach, bed in low position and locked, siderails up x 2, use non-slip footwear, adequate lighting, clutter free and spill free environment, educate on level of risk, educate to call for assistance    Fall Risk Level Interventions:    TRIAL ([employer] 8, NEURO, MED NUBIA 5) Moderate Fall Risk Interventions  Place yellow fall risk ID band on patient: verified  Provide patient/family education based on risk assessment : completed  Educate patient/family to call staff for assistance when getting out of bed: completed  Place fall precaution signage outside patient door: verified  Utilize bed/chair fall alarm: completedTRIAL ([employer] 8, NEURO, MED NUBIA 5) High Fall Risk Interventions  Place yellow fall risk ID band on patient: completed  Provide patient/family education based on risk assessment: completed  Educate patient/family to call staff for assistance when getting out of bed: completed  Place fall precaution signage outside patient door: completed  Place patient in room close to nursing station: completed  Utilize bed/chair fall alarm: completed  Notify charge of high risk for huddle: completed    Patient Specific Interventions:     Medication: limit combination of prn medications  Mental Status/LOC/Awareness: check on patient hourly  Toileting: provide frquent toileting  Volume/Electrolyte Status: not  applicable  Communication/Sensory: ensure proper positioning when transferrng/ambulating  Behavioral: administer medication as ordered  Mobility: dangle prior to standing

## 2017-08-16 NOTE — DISCHARGE PLANNING
Upon utilization review, patient noted to be on the following medications that could potentially require prior authorization if prescribed at discharge: PLAVIX.  If it is anticipated that patient will require these medications at discharge, beginning the prescription prior auth process in advance to anticipated discharge could assist in preventing delays when patient is medically cleared to be discharged from the hospital.

## 2017-08-16 NOTE — DISCHARGE PLANNING
JENNIFER faxed pt's Plavix prescription to [facility] in [place] as requested. Spoke to Norris, no Prior Auth needed.

## 2017-08-16 NOTE — PROGRESS NOTES
[facility] Hospitalist Progress Note    Date of Service: 8/15/2017    Chief Complaint  66 y.o. Male hx of hypertension, COPD, chronic back, shoulder pain, depression,   admitted 8/10/2017 with dizziness, light headedness, nearly passed out.  Pt chronically ill with home care takers, SO is on hospice  Interval Problem Update  Patient seen and examined today.    Patient tolerating treatment and therapies.  All Data, Medication data reviewed.  Case discussed with nursing as available.  Plan of Care reviewed with patient and notified of changes.    MRI findings of bilateral acute CVAs .  Dizziness improved.  States he as always been unsteady w use of cane.   8/15 Pt feels better, would like to get back home, OT/PT eval P, he feels he wants to be just discharged, no rehab for him   Consultants/Specialty  none    Disposition  Home when stable.         Review of Systems   Constitutional: Negative for fever and chills.        Chr. Pain     HENT: Negative for congestion.    Eyes: Negative for discharge and redness.   Respiratory: Negative for cough, shortness of breath and stridor.    Cardiovascular: Negative for chest pain and leg swelling.   Gastrointestinal: Negative for vomiting and abdominal pain.   Genitourinary: Negative for hematuria and flank pain.   Musculoskeletal: Positive for myalgias and back pain.        Chronic    Skin: Negative for itching and rash.   Neurological: Positive for dizziness (signif  better, ). Negative for sensory change, speech change and focal weakness.   Psychiatric/Behavioral: Positive for substance abuse. Negative for hallucinations.      Physical Exam  Laboratory/Imaging   Hemodynamics  Temp (24hrs), Av.4 °C (97.5 °F), Min:36 °C (96.8 °F), Max:37.2 °C (98.9 °F)   Temperature: 36.4 °C (97.6 °F)  Pulse  Av.3  Min: 60  Max: 85    Blood Pressure : 132/79 mmHg      Respiratory      Respiration: 19, Pulse Oximetry: 96 %        RUL Breath Sounds: Clear, RML Breath Sounds: Diminished, RLL  Breath Sounds: Diminished, CHACHA Breath Sounds: Clear, LLL Breath Sounds: Diminished    Fluids    Intake/Output Summary (Last 24 hours) at 08/15/17 1721  Last data filed at 08/15/17 0800   Gross per 24 hour   Intake    890 ml   Output      0 ml   Net    890 ml       Nutrition  Orders Placed This Encounter   Procedures   • Diet Order     Standing Status: Standing      Number of Occurrences: 1      Standing Expiration Date:      Order Specific Question:  Diet:     Answer:  Regular [1]     Physical Exam   Constitutional: He is oriented to person, place, and time. No distress.   Eyes: EOM are normal. No scleral icterus.   Neck: Neck supple.   Cardiovascular: Normal rate and regular rhythm.    No murmur heard.  Pulmonary/Chest: No stridor. He has no wheezes. He has no rales.   Abdominal: Soft. Bowel sounds are normal. He exhibits no distension. There is no tenderness.   Musculoskeletal: He exhibits no edema or tenderness.   Neurological: He is alert and oriented to person, place, and time. No cranial nerve deficit. Coordination normal.   No focal weakness   Skin: Skin is warm and dry. He is not diaphoretic. No erythema.   Psychiatric: He has a normal mood and affect. His behavior is normal.           Recent Labs      08/13/17   0242  08/13/17   1015  08/15/17   0229   SODIUM  132*  132*  130*   POTASSIUM  3.0*  3.4*  4.4   CHLORIDE  100  102  101   CO2  24  24  23   GLUCOSE  99  96  88   BUN  7*  4*  15   CREATININE  0.76  0.64  0.91   CALCIUM  8.9  8.7  9.3             Recent Labs      08/15/17   0229   TRIGLYCERIDE  117   HDL  46   LDL  75          Assessment/Plan     * CVA (cerebral vascular accident) (CMS-HCC)  Assessment & Plan  Mri findings of Small, right greater than left, acute thalamic infarcts.  Previously on asa daily - failure for stroke prevention- start plavix, high dose statin.   Ok echo, carotid us pending  PT / OT   Stroke order set  Pt declines Rehab    Thalamic infarct, acute (CMS-HCC)- Bilateral  (present on admission)  Assessment & Plan  As above.    Hypokalemia (present on admission)  Assessment & Plan  Improved , likely from diarrhea - now resolved.  oral kcl replacement   Fu labs.  Adequate oral intake- hl ivfs.    Hypertensive urgency (present on admission)  Assessment & Plan  Started on amlodipine 10 mg, lisinopril 20 mg- improved bp control   Continue  IV enalapril 1.25 mg every 6 hours prn 180       Hyponatremia (present on admission)  Assessment & Plan  Hypo osmolar - improved - mild asymptomatic.         Dizziness (present on admission)  Assessment & Plan  Bilateral thalamic infarcts R>L on MRI  Unsteady- states he usually uses a Cane at home.      Hyperglycemia (present on admission)  Assessment & Plan  Resolved  Fu bs.      Dependence on nicotine from cigarettes (present on admission)  Assessment & Plan  Spent > 10 minutes on smoking cessation education recently    will need ongoing re enforcement-- provide nicotine patch.      Labs reviewed, Medications reviewed and Radiology images reviewed  Estrada catheter: No Estrada      DVT Prophylaxis: Heparin              Plan  C/w w/u  PT eval  Carotid pending  See orders  Likely home in am

## 2017-08-16 NOTE — FACE TO FACE
Face to Face Supporting Documentation - Home Health    The encounter with this patient was in whole or in part the primary reason for home health admission.    Date of encounter:   Patient:                    MRN:                       YOB: 2018  Will Fuentes  [id number]  1950     Home health to see patient for:  Skilled Nursing care for assessment, interventions & education    Skilled need for:  New Onset Medical Diagnosis CVA    Skilled nursing interventions to include:  Comment: Pt Ot    Homebound status evidenced by:  Needs the assistance of another person in order to leave the home. Leaving home requires a considerable and taxing effort. There is a normal inability to leave the home.    Community Physician to provide follow up care: Judson Roman M.D.     Optional Interventions? No      I certify the face to face encounter for this home health care referral meets the CMS requirements and the encounter/clinical assessment with the patient was, in whole, or in part, for the medical condition(s) listed above, which is the primary reason for home health care. Based on my clinical findings: the service(s) are medically necessary, support the need for home health care, and the homebound criteria are met.  I certify that this patient has had a face to face encounter by myself.  Andrew Vasquez M.D. - NPI: [id number]

## 2017-08-16 NOTE — PROGRESS NOTES
Bedside report completed with PETER Duke (night shift nurse)  Pt was sleeping and we decided not to wake him up.  Call light within reach. White Board updated.  Will return when he is awake and introduce myself.  Appears stable. On Tele monitor.

## 2017-08-17 ENCOUNTER — PATIENT OUTREACH (OUTPATIENT)
Dept: HEALTH INFORMATION MANAGEMENT | Facility: OTHER | Age: 67
End: 2017-08-17

## 2017-08-17 PROBLEM — I16.0 HYPERTENSIVE URGENCY: Status: RESOLVED | Noted: 2017-08-10 | Resolved: 2017-08-17

## 2017-08-17 PROBLEM — E87.6 HYPOKALEMIA: Status: RESOLVED | Noted: 2017-08-10 | Resolved: 2017-08-17
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
## 2017-08-17 NOTE — PROGRESS NOTES
08/17/2017 0902 - Discharge Outreach - Not accepting calls.   08/17/2017 1132 - Discharge Outreach - Not accepting calls.   08/17/2017 1348 - Discharge Outreach - Not accepting calls.

## 2017-08-17 NOTE — PROGRESS NOTES
[facility] Hospitalist Progress Note    Date of Service: 2017    Chief Complaint  66 y.o. Male hx of hypertension, COPD, chronic back, shoulder pain, depression,   admitted 8/10/2017 with dizziness, light headedness, nearly passed out.  Pt chronically ill with home care takers, SO is on hospice  Interval Problem Update  Patient seen and examined today.    Patient tolerating treatment and therapies.  All Data, Medication data reviewed.  Case discussed with nursing as available.  Plan of Care reviewed with patient and notified of changes.    MRI findings of bilateral acute CVAs .  Dizziness improved.  States he as always been unsteady w use of cane.   8/15 Pt feels better, would like to get back home, OT/PT eval P, he feels he wants to be just discharged, no rehab for him    Pt feels better, ok for d/c, consents now to home health  Consultants/Specialty  none    Disposition  Home when stable.         Review of Systems   Constitutional: Negative for fever and chills.        Chr. Pain     HENT: Negative for congestion.    Eyes: Negative for discharge and redness.   Respiratory: Negative for cough, shortness of breath and stridor.    Cardiovascular: Negative for chest pain and leg swelling.   Gastrointestinal: Negative for vomiting and abdominal pain.   Genitourinary: Negative for hematuria and flank pain.   Musculoskeletal: Positive for myalgias and back pain.        Chronic    Skin: Negative for itching and rash.   Neurological: Positive for dizziness (signif  better, ). Negative for sensory change, speech change and focal weakness.   Psychiatric/Behavioral: Positive for substance abuse. Negative for hallucinations.      Physical Exam  Laboratory/Imaging   Hemodynamics  Temp (24hrs), Av.5 °C (97.7 °F), Min:36.2 °C (97.1 °F), Max:36.9 °C (98.5 °F)   Temperature:  (pt to d/c)  Pulse  Av.6  Min: 56  Max: 85    Blood Pressure :  (pt to d/c)      Respiratory      Respiration: 16, Pulse Oximetry:  (pt to d/c)         RUL Breath Sounds: Clear, RML Breath Sounds: Diminished, RLL Breath Sounds: Diminished, CHACHA Breath Sounds: Clear, LLL Breath Sounds: Diminished    Fluids    Intake/Output Summary (Last 24 hours) at 08/16/17 2113  Last data filed at 08/16/17 0400   Gross per 24 hour   Intake      0 ml   Output    225 ml   Net   -225 ml       Nutrition  No orders of the defined types were placed in this encounter.     Physical Exam   Constitutional: He is oriented to person, place, and time. No distress.   Eyes: EOM are normal. No scleral icterus.   Neck: Neck supple.   Cardiovascular: Normal rate and regular rhythm.    No murmur heard.  Pulmonary/Chest: No stridor. He has no wheezes. He has no rales.   Abdominal: Soft. Bowel sounds are normal. He exhibits no distension. There is no tenderness.   Musculoskeletal: He exhibits no edema or tenderness.   Neurological: He is alert and oriented to person, place, and time. No cranial nerve deficit. Coordination normal.   No focal weakness   Skin: Skin is warm and dry. He is not diaphoretic. No erythema.   Psychiatric: He has a normal mood and affect. His behavior is normal.       Recent Labs      08/16/17   0322   WBC  7.0   RBC  5.01   HEMOGLOBIN  15.3   HEMATOCRIT  44.5   MCV  88.8   MCH  30.5   MCHC  34.4   RDW  47.0   PLATELETCT  181   MPV  11.9     Recent Labs      08/15/17   0229  08/16/17   0322   SODIUM  130*  132*   POTASSIUM  4.4  4.5   CHLORIDE  101  99   CO2  23  25   GLUCOSE  88  79   BUN  15  16   CREATININE  0.91  1.05   CALCIUM  9.3  9.1             Recent Labs      08/15/17   0229   TRIGLYCERIDE  117   HDL  46   LDL  75          Assessment/Plan     * CVA (cerebral vascular accident) (CMS-[facility])  Assessment & Plan  Mri findings of Small, right greater than left, acute thalamic infarcts.  Previously on asa daily - failure for stroke prevention- start plavix, high dose statin.   Ok echo, carotid us pending  PT / OT   Stroke order set  Pt declines Rehab    Thalamic infarct,  acute (CMS-HCC)- Bilateral (present on admission)  Assessment & Plan  As above.    Hypokalemia (present on admission)  Assessment & Plan  Improved , likely from diarrhea - now resolved.  oral kcl replacement   Fu labs.  Adequate oral intake- hl ivfs.    Hypertensive urgency (present on admission)  Assessment & Plan  Started on amlodipine 10 mg, lisinopril 20 mg- improved bp control   Continue  IV enalapril 1.25 mg every 6 hours prn 180       Hyponatremia (present on admission)  Assessment & Plan  Hypo osmolar - improved - mild asymptomatic.         Dizziness (present on admission)  Assessment & Plan  Bilateral thalamic infarcts R>L on MRI  Unsteady- states he usually uses a Cane at home.      Hyperglycemia (present on admission)  Assessment & Plan  Resolved  Fu bs.      Dependence on nicotine from cigarettes (present on admission)  Assessment & Plan  Spent > 10 minutes on smoking cessation education recently    will need ongoing re enforcement-- provide nicotine patch.      Labs reviewed, Medications reviewed and Radiology images reviewed  Estrada catheter: No Estrada      DVT Prophylaxis: Heparin              Plan  Ok for d/c  H/H  See orders  Close f/u  Stroke [facility]

## 2017-11-15 ENCOUNTER — HOSPITAL ENCOUNTER (OUTPATIENT)
Dept: LAB | Facility: MEDICAL CENTER | Age: 67
End: 2017-11-15
Attending: FAMILY MEDICINE
Payer: MEDICARE

## 2017-11-15 LAB
25(OH)D3 SERPL-MCNC: 11 NG/ML (ref 30–100)
ALBUMIN SERPL BCP-MCNC: 3.9 G/DL (ref 3.2–4.9)
ALBUMIN/GLOB SERPL: 1.1 G/DL
ALP SERPL-CCNC: 103 U/L (ref 30–99)
ALT SERPL-CCNC: 31 U/L (ref 2–50)
ANION GAP SERPL CALC-SCNC: 7 MMOL/L (ref 0–11.9)
AST SERPL-CCNC: 28 U/L (ref 12–45)
BASOPHILS # BLD AUTO: 1.4 % (ref 0–1.8)
BASOPHILS # BLD: 0.17 K/UL (ref 0–0.12)
BILIRUB SERPL-MCNC: 0.4 MG/DL (ref 0.1–1.5)
BUN SERPL-MCNC: 27 MG/DL (ref 8–22)
CALCIUM SERPL-MCNC: 9.2 MG/DL (ref 8.5–10.5)
CHLORIDE SERPL-SCNC: 102 MMOL/L (ref 96–112)
CHOLEST SERPL-MCNC: 139 MG/DL (ref 100–199)
CO2 SERPL-SCNC: 26 MMOL/L (ref 20–33)
CREAT SERPL-MCNC: 1.32 MG/DL (ref 0.5–1.4)
EOSINOPHIL # BLD AUTO: 0.36 K/UL (ref 0–0.51)
EOSINOPHIL NFR BLD: 3.1 % (ref 0–6.9)
ERYTHROCYTE [DISTWIDTH] IN BLOOD BY AUTOMATED COUNT: 45.1 FL (ref 35.9–50)
GFR SERPL CREATININE-BSD FRML MDRD: 54 ML/MIN/1.73 M 2
GLOBULIN SER CALC-MCNC: 3.7 G/DL (ref 1.9–3.5)
GLUCOSE SERPL-MCNC: 97 MG/DL (ref 65–99)
HCT VFR BLD AUTO: 45.4 % (ref 42–52)
HCV AB SER QL: REACTIVE
HDLC SERPL-MCNC: 33 MG/DL
HGB BLD-MCNC: 15.6 G/DL (ref 14–18)
IMM GRANULOCYTES # BLD AUTO: 0.03 K/UL (ref 0–0.11)
IMM GRANULOCYTES NFR BLD AUTO: 0.3 % (ref 0–0.9)
LDLC SERPL CALC-MCNC: 68 MG/DL
LYMPHOCYTES # BLD AUTO: 2.73 K/UL (ref 1–4.8)
LYMPHOCYTES NFR BLD: 23.2 % (ref 22–41)
MAGNESIUM SERPL-MCNC: 2.5 MG/DL (ref 1.5–2.5)
MCH RBC QN AUTO: 30.8 PG (ref 27–33)
MCHC RBC AUTO-ENTMCNC: 34.4 G/DL (ref 33.7–35.3)
MCV RBC AUTO: 89.5 FL (ref 81.4–97.8)
MONOCYTES # BLD AUTO: 1.11 K/UL (ref 0–0.85)
MONOCYTES NFR BLD AUTO: 9.4 % (ref 0–13.4)
NEUTROPHILS # BLD AUTO: 7.37 K/UL (ref 1.82–7.42)
NEUTROPHILS NFR BLD: 62.6 % (ref 44–72)
NRBC # BLD AUTO: 0 K/UL
NRBC BLD AUTO-RTO: 0 /100 WBC
PLATELET # BLD AUTO: 253 K/UL (ref 164–446)
PMV BLD AUTO: 11.8 FL (ref 9–12.9)
POTASSIUM SERPL-SCNC: 3.8 MMOL/L (ref 3.6–5.5)
PROT SERPL-MCNC: 7.6 G/DL (ref 6–8.2)
PSA SERPL-MCNC: 1.36 NG/ML (ref 0–4)
RBC # BLD AUTO: 5.07 M/UL (ref 4.7–6.1)
SODIUM SERPL-SCNC: 135 MMOL/L (ref 135–145)
TESTOST SERPL-MCNC: 175 NG/DL (ref 175–781)
TRIGL SERPL-MCNC: 188 MG/DL (ref 0–149)
TSH SERPL DL<=0.005 MIU/L-ACNC: 3.76 UIU/ML (ref 0.3–3.7)
VIT B12 SERPL-MCNC: 340 PG/ML (ref 211–911)
WBC # BLD AUTO: 11.8 K/UL (ref 4.8–10.8)

## 2017-11-15 PROCEDURE — 36415 COLL VENOUS BLD VENIPUNCTURE: CPT

## 2017-11-15 PROCEDURE — 82306 VITAMIN D 25 HYDROXY: CPT

## 2017-11-15 PROCEDURE — 80061 LIPID PANEL: CPT

## 2017-11-15 PROCEDURE — 84153 ASSAY OF PSA TOTAL: CPT | Mod: GA

## 2017-11-15 PROCEDURE — 87522 HEPATITIS C REVRS TRNSCRPJ: CPT

## 2017-11-15 PROCEDURE — 85025 COMPLETE CBC W/AUTO DIFF WBC: CPT

## 2017-11-15 PROCEDURE — 82607 VITAMIN B-12: CPT

## 2017-11-15 PROCEDURE — 84403 ASSAY OF TOTAL TESTOSTERONE: CPT

## 2017-11-15 PROCEDURE — 80053 COMPREHEN METABOLIC PANEL: CPT

## 2017-11-15 PROCEDURE — 83735 ASSAY OF MAGNESIUM: CPT

## 2017-11-15 PROCEDURE — 84443 ASSAY THYROID STIM HORMONE: CPT

## 2017-11-15 PROCEDURE — 86803 HEPATITIS C AB TEST: CPT

## 2017-11-18 LAB
HCV RNA SERPL NAA+PROBE-ACNC: ABNORMAL IU/ML
HCV RNA SERPL NAA+PROBE-LOG IU: 6.6 LOG IU
HCV RNA SERPL QL NAA+PROBE: DETECTED
PATHOLOGY STUDY: ABNORMAL

## 2018-01-01 ENCOUNTER — HOSPITAL ENCOUNTER (OUTPATIENT)
Dept: RADIOLOGY | Facility: MEDICAL CENTER | Age: 68
End: 2018-11-28
Attending: FAMILY MEDICINE
Payer: MEDICARE

## 2018-01-01 DIAGNOSIS — R59.9 SWELLING OF LYMPH NODES: ICD-10-CM

## 2018-01-01 DIAGNOSIS — R91.8 LUNG MASS: ICD-10-CM

## 2018-01-01 PROCEDURE — A9552 F18 FDG: HCPCS

## 2018-04-11 ENCOUNTER — OFFICE VISIT (OUTPATIENT)
Dept: CARDIOLOGY | Facility: MEDICAL CENTER | Age: 68
End: 2018-04-11
Payer: MEDICARE

## 2018-04-11 VITALS
SYSTOLIC BLOOD PRESSURE: 106 MMHG | WEIGHT: 150 LBS | BODY MASS INDEX: 22.22 KG/M2 | DIASTOLIC BLOOD PRESSURE: 60 MMHG | HEART RATE: 84 BPM | OXYGEN SATURATION: 93 % | HEIGHT: 69 IN

## 2018-04-11 DIAGNOSIS — Z86.73 HISTORY OF STROKE: ICD-10-CM

## 2018-04-11 DIAGNOSIS — I10 ESSENTIAL HYPERTENSION, BENIGN: ICD-10-CM

## 2018-04-11 DIAGNOSIS — R42 DIZZINESS: ICD-10-CM

## 2018-04-11 PROCEDURE — 93000 ELECTROCARDIOGRAM COMPLETE: CPT | Performed by: INTERNAL MEDICINE

## 2018-04-11 PROCEDURE — 99204 OFFICE O/P NEW MOD 45 MIN: CPT | Performed by: INTERNAL MEDICINE

## 2018-04-11 RX ORDER — ATORVASTATIN CALCIUM 80 MG/1
80 TABLET, FILM COATED ORAL DAILY
COMMUNITY
End: 2019-01-01

## 2018-04-11 RX ORDER — LOSARTAN POTASSIUM 50 MG/1
TABLET ORAL
COMMUNITY
Start: 2018-03-08 | End: 2018-05-08

## 2018-04-11 ASSESSMENT — ENCOUNTER SYMPTOMS
COUGH: 0
EYE PAIN: 0
PND: 0
BLURRED VISION: 0
HEADACHES: 0
DIZZINESS: 1
VOMITING: 0
FALLS: 0
DEPRESSION: 0
BRUISES/BLEEDS EASILY: 0
CLAUDICATION: 0
FEVER: 0
EYE DISCHARGE: 0
HALLUCINATIONS: 0
CHILLS: 0
BLOOD IN STOOL: 0
WEIGHT LOSS: 0
SHORTNESS OF BREATH: 0
ABDOMINAL PAIN: 0
LOSS OF CONSCIOUSNESS: 0
DOUBLE VISION: 0
ORTHOPNEA: 0
MYALGIAS: 0
SPEECH CHANGE: 0
NAUSEA: 0
PALPITATIONS: 0
SENSORY CHANGE: 0

## 2018-04-11 NOTE — LETTER
[facility]   [address] [place] [address] [address]  [place] [address]  Phone: [phone]  Fax: [phone]              Will Fuentes  1950    Encounter Date: 4/11/2018    Surya Jordan M.D.          PROGRESS NOTE:  Chief Complaint   Patient presents with   • Hypertension     NEW PATIENT       Subjective:   Will Fuentes is a 67 y.o. male who presents today for cardiac care and evaluation due to uncontrolled hypertension. Patient also has a history of drug-seeking behavior along with chronic lower back pain. He is on chronic pain medication. He does not have any prior history of cardiac surgery or procedures.    At this time, he feels lightheaded.    He is on both Lisinopril and Losartan possible due to his mistakes along with amlodipine 10 mg by mouth once a day. His blood pressure is low today.    Past Medical History:   Diagnosis Date   • Breath shortness    • Chronic left shoulder pain     2003   • Chronic low back pain     2003   • COPD    • Drug-seeking behavior    • Heart burn    • Hypertension    • Hypertension    • Pain    • Psoriasis    • Psychiatric problem     depression     Past Surgical History:   Procedure Laterality Date   • OTHER ORTHOPEDIC SURGERY      back,   • OTHER ORTHOPEDIC SURGERY      rt. leg.  spiral fx. 1980's   • SHOULDER SURGERY      Bilateral with screws 1991     History reviewed. No pertinent family history.  Social History     Social History   • Marital status:      Spouse name: N/A   • Number of children: N/A   • Years of education: N/A     Occupational History   • Not on file.     Social History Main Topics   • Smoking status: Current Every Day Smoker     Packs/day: 0.50     Types: Cigarettes   • Smokeless tobacco: Never Used      Comment: 2 ppd for 50 yrs   • Alcohol use 1.2 oz/week     2 Cans of beer per week      Comment: occassionally   • Drug use: Yes      Comment: Prescription  (morphine and norco)   • Sexual  activity: Not on file     Other Topics Concern   • Not on file     Social History Narrative   • No narrative on file     Allergies   Allergen Reactions   • Darvocet [Propoxyphene N-Apap]    • Flexeril [Cyclobenzaprine Hcl]    • Nsaids Swelling     Fluid retention     Outpatient Encounter Prescriptions as of 4/11/2018   Medication Sig Dispense Refill   • aspirin EC (ECOTRIN) 81 MG Tablet Delayed Response Take 81 mg by mouth every day.     • atorvastatin (LIPITOR) 80 MG tablet      • losartan (COZAAR) 50 MG Tab      • cholecalciferol (VITAMIN D3) 400 UNIT Tab Take 400 Units by mouth every day.     • clopidogrel (PLAVIX) 75 MG Tab Take 1 Tab by mouth every day. 30 Tab 2   • zolpidem (AMBIEN) 10 MG Tab Take 10 mg by mouth at bedtime as needed for Sleep.     • albuterol 108 (90 BASE) MCG/ACT Aero Soln inhalation aerosol Inhale 2 Puffs by mouth every 6 hours as needed for Shortness of Breath.     • morphine SR (MS CONTIN) 60 MG TB12 Take 1 Tab by mouth 3 times a day. 21 Each 0   • hydrocodone/apap  (NORCO)  MG TABS Take 1 Tab by mouth every 6 hours as needed for Mild Pain. 150 Each 1   • ADVAIR DISKUS 100-50 MCG/DOSE INH MISC Inhale 1 Puff by mouth 2 times a day as needed.     • amlodipine (NORVASC) 10 MG Tab Take 1 Tab by mouth every day. 30 Tab 1   • [DISCONTINUED] lisinopril (PRINIVIL) 20 MG Tab Take 1 Tab by mouth every day. 30 Tab 1     No facility-administered encounter medications on file as of 4/11/2018.      Review of Systems   Constitutional: Negative for chills, fever, malaise/fatigue and weight loss.   HENT: Negative for ear discharge, ear pain, hearing loss and nosebleeds.    Eyes: Negative for blurred vision, double vision, pain and discharge.   Respiratory: Negative for cough and shortness of breath.    Cardiovascular: Negative for chest pain, palpitations, orthopnea, claudication, leg swelling and PND.   Gastrointestinal: Negative for abdominal pain, blood in stool, melena, nausea and vomiting.  "  Genitourinary: Negative for dysuria and hematuria.   Musculoskeletal: Negative for falls, joint pain and myalgias.   Skin: Negative for itching and rash.   Neurological: Positive for dizziness. Negative for sensory change, speech change, loss of consciousness and headaches.   Endo/Heme/Allergies: Negative for environmental allergies. Does not bruise/bleed easily.   Psychiatric/Behavioral: Negative for depression, hallucinations and suicidal ideas.        Objective:   /60   Pulse 84   Ht 1.753 m (5' 9\")   Wt 68 kg (150 lb)   SpO2 93%   BMI 22.15 kg/m²      Physical Exam   Constitutional: He is oriented to person, place, and time. No distress.   HENT:   Head: Normocephalic and atraumatic.   Right Ear: External ear normal.   Left Ear: External ear normal.   Eyes: Right eye exhibits no discharge. Left eye exhibits no discharge.   Neck: No JVD present. No thyromegaly present.   Cardiovascular: Normal rate, regular rhythm, normal heart sounds and intact distal pulses.  Exam reveals no gallop and no friction rub.    No murmur heard.  Pulmonary/Chest: Breath sounds normal. No respiratory distress.   Abdominal: Bowel sounds are normal. He exhibits no distension. There is no tenderness.   Musculoskeletal: He exhibits no edema or tenderness.   Neurological: He is alert and oriented to person, place, and time. No cranial nerve deficit.   Skin: Skin is warm and dry. He is not diaphoretic.   Psychiatric: He has a normal mood and affect. His behavior is normal.   Nursing note and vitals reviewed.      Assessment:     1. History of stroke     2. Essential hypertension, benign  EKG   3. Dizziness         Medical Decision Making:  Today's Assessment / Status / Plan:   Will stop Lisinopril.  Dizziness might be over-medicated.      Judson Roman M.D.  [address] [place] [address]  VIA Facsimile: [phone]                 "

## 2018-04-11 NOTE — PROGRESS NOTES
Chief Complaint   Patient presents with   • Hypertension     NEW PATIENT       Subjective:   Will Fuentes is a 67 y.o. male who presents today for cardiac care and evaluation due to uncontrolled hypertension. Patient also has a history of drug-seeking behavior along with chronic lower back pain. He is on chronic pain medication. He does not have any prior history of cardiac surgery or procedures.    At this time, he feels lightheaded.    He is on both Lisinopril and Losartan possible due to his mistakes along with amlodipine 10 mg by mouth once a day. His blood pressure is low today.    Past Medical History:   Diagnosis Date   • Breath shortness    • Chronic left shoulder pain     2003   • Chronic low back pain     2003   • COPD    • Drug-seeking behavior    • Heart burn    • Hypertension    • Hypertension    • Pain    • Psoriasis    • Psychiatric problem     depression     Past Surgical History:   Procedure Laterality Date   • OTHER ORTHOPEDIC SURGERY      back,   • OTHER ORTHOPEDIC SURGERY      rt. leg.  spiral fx. 1980's   • SHOULDER SURGERY      Bilateral with screws 1991     History reviewed. No pertinent family history.  Social History     Social History   • Marital status:      Spouse name: N/A   • Number of children: N/A   • Years of education: N/A     Occupational History   • Not on file.     Social History Main Topics   • Smoking status: Current Every Day Smoker     Packs/day: 0.50     Types: Cigarettes   • Smokeless tobacco: Never Used      Comment: 2 ppd for 50 yrs   • Alcohol use 1.2 oz/week     2 Cans of beer per week      Comment: occassionally   • Drug use: Yes      Comment: Prescription  (morphine and norco)   • Sexual activity: Not on file     Other Topics Concern   • Not on file     Social History Narrative   • No narrative on file     Allergies   Allergen Reactions   • Darvocet [Propoxyphene N-Apap]    • Flexeril [Cyclobenzaprine Hcl]    • Nsaids Swelling     Fluid retention      Outpatient Encounter Prescriptions as of 4/11/2018   Medication Sig Dispense Refill   • aspirin EC (ECOTRIN) 81 MG Tablet Delayed Response Take 81 mg by mouth every day.     • atorvastatin (LIPITOR) 80 MG tablet      • losartan (COZAAR) 50 MG Tab      • cholecalciferol (VITAMIN D3) 400 UNIT Tab Take 400 Units by mouth every day.     • clopidogrel (PLAVIX) 75 MG Tab Take 1 Tab by mouth every day. 30 Tab 2   • zolpidem (AMBIEN) 10 MG Tab Take 10 mg by mouth at bedtime as needed for Sleep.     • albuterol 108 (90 BASE) MCG/ACT Aero Soln inhalation aerosol Inhale 2 Puffs by mouth every 6 hours as needed for Shortness of Breath.     • morphine SR (MS CONTIN) 60 MG TB12 Take 1 Tab by mouth 3 times a day. 21 Each 0   • hydrocodone/apap  (NORCO)  MG TABS Take 1 Tab by mouth every 6 hours as needed for Mild Pain. 150 Each 1   • ADVAIR DISKUS 100-50 MCG/DOSE INH MISC Inhale 1 Puff by mouth 2 times a day as needed.     • amlodipine (NORVASC) 10 MG Tab Take 1 Tab by mouth every day. 30 Tab 1   • [DISCONTINUED] lisinopril (PRINIVIL) 20 MG Tab Take 1 Tab by mouth every day. 30 Tab 1     No facility-administered encounter medications on file as of 4/11/2018.      Review of Systems   Constitutional: Negative for chills, fever, malaise/fatigue and weight loss.   HENT: Negative for ear discharge, ear pain, hearing loss and nosebleeds.    Eyes: Negative for blurred vision, double vision, pain and discharge.   Respiratory: Negative for cough and shortness of breath.    Cardiovascular: Negative for chest pain, palpitations, orthopnea, claudication, leg swelling and PND.   Gastrointestinal: Negative for abdominal pain, blood in stool, melena, nausea and vomiting.   Genitourinary: Negative for dysuria and hematuria.   Musculoskeletal: Negative for falls, joint pain and myalgias.   Skin: Negative for itching and rash.   Neurological: Positive for dizziness. Negative for sensory change, speech change, loss of consciousness  "and headaches.   Endo/Heme/Allergies: Negative for environmental allergies. Does not bruise/bleed easily.   Psychiatric/Behavioral: Negative for depression, hallucinations and suicidal ideas.        Objective:   /60   Pulse 84   Ht 1.753 m (5' 9\")   Wt 68 kg (150 lb)   SpO2 93%   BMI 22.15 kg/m²     Physical Exam   Constitutional: He is oriented to person, place, and time. No distress.   HENT:   Head: Normocephalic and atraumatic.   Right Ear: External ear normal.   Left Ear: External ear normal.   Eyes: Right eye exhibits no discharge. Left eye exhibits no discharge.   Neck: No JVD present. No thyromegaly present.   Cardiovascular: Normal rate, regular rhythm, normal heart sounds and intact distal pulses.  Exam reveals no gallop and no friction rub.    No murmur heard.  Pulmonary/Chest: Breath sounds normal. No respiratory distress.   Abdominal: Bowel sounds are normal. He exhibits no distension. There is no tenderness.   Musculoskeletal: He exhibits no edema or tenderness.   Neurological: He is alert and oriented to person, place, and time. No cranial nerve deficit.   Skin: Skin is warm and dry. He is not diaphoretic.   Psychiatric: He has a normal mood and affect. His behavior is normal.   Nursing note and vitals reviewed.      Assessment:     1. History of stroke     2. Essential hypertension, benign  EKG   3. Dizziness         Medical Decision Making:  Today's Assessment / Status / Plan:   Will stop Lisinopril.  Dizziness might be over-medicated.  "

## 2018-04-13 LAB — EKG IMPRESSION: NORMAL

## 2018-05-08 ENCOUNTER — OFFICE VISIT (OUTPATIENT)
Dept: CARDIOLOGY | Facility: MEDICAL CENTER | Age: 68
End: 2018-05-08
Payer: MEDICARE

## 2018-05-08 VITALS
HEART RATE: 86 BPM | SYSTOLIC BLOOD PRESSURE: 210 MMHG | DIASTOLIC BLOOD PRESSURE: 100 MMHG | OXYGEN SATURATION: 95 % | BODY MASS INDEX: 21.53 KG/M2 | WEIGHT: 145.4 LBS | HEIGHT: 69 IN

## 2018-05-08 DIAGNOSIS — Z79.899 HIGH RISK MEDICATION USE: ICD-10-CM

## 2018-05-08 DIAGNOSIS — Z86.73 HISTORY OF STROKE: ICD-10-CM

## 2018-05-08 DIAGNOSIS — I10 HTN (HYPERTENSION), MALIGNANT: ICD-10-CM

## 2018-05-08 PROCEDURE — 99215 OFFICE O/P EST HI 40 MIN: CPT | Performed by: INTERNAL MEDICINE

## 2018-05-08 RX ORDER — LOSARTAN POTASSIUM 100 MG/1
100 TABLET ORAL DAILY
Qty: 30 TAB | Refills: 11 | Status: SHIPPED | OUTPATIENT
Start: 2018-05-08 | End: 2019-01-01

## 2018-05-08 RX ORDER — LABETALOL 300 MG/1
300 TABLET, FILM COATED ORAL 2 TIMES DAILY
Qty: 60 TAB | Refills: 11 | Status: SHIPPED | OUTPATIENT
Start: 2018-05-08 | End: 2019-01-01

## 2018-05-08 ASSESSMENT — ENCOUNTER SYMPTOMS
HALLUCINATIONS: 0
PALPITATIONS: 0
ABDOMINAL PAIN: 0
EYE PAIN: 0
VOMITING: 0
HEADACHES: 0
DEPRESSION: 0
ORTHOPNEA: 0
MYALGIAS: 0
PND: 0
SPEECH CHANGE: 0
FEVER: 0
BLURRED VISION: 0
SHORTNESS OF BREATH: 0
DOUBLE VISION: 0
SENSORY CHANGE: 0
BLOOD IN STOOL: 0
DIZZINESS: 0
BRUISES/BLEEDS EASILY: 0
CLAUDICATION: 0
NAUSEA: 0
WEIGHT LOSS: 0
LOSS OF CONSCIOUSNESS: 0
EYE DISCHARGE: 0
CHILLS: 0
COUGH: 0
FALLS: 0

## 2018-05-08 NOTE — PROGRESS NOTES
Chief Complaint   Patient presents with   • Hypertension       Subjective:   Will Fuentes is a 67 y.o. male who presents today for cardiac care and evaluation due to uncontrolled hypertension. Patient also has a history of drug-seeking behavior along with chronic lower back pain. He is on chronic pain medication. He does not have any prior history of cardiac surgery or procedures.     At this time, he did not take his medications this AM. BP is very high. No symptoms however.    Past Medical History:   Diagnosis Date   • Breath shortness    • Chronic left shoulder pain     2003   • Chronic low back pain     2003   • COPD    • Drug-seeking behavior    • Heart burn    • Hypertension    • Hypertension    • Pain    • Psoriasis    • Psychiatric problem     depression     Past Surgical History:   Procedure Laterality Date   • OTHER ORTHOPEDIC SURGERY      back,   • OTHER ORTHOPEDIC SURGERY      rt. leg.  spiral fx. 1980's   • SHOULDER SURGERY      Bilateral with screws 1991     No family history on file.  Social History     Social History   • Marital status:      Spouse name: N/A   • Number of children: N/A   • Years of education: N/A     Occupational History   • Not on file.     Social History Main Topics   • Smoking status: Current Every Day Smoker     Packs/day: 0.50     Types: Cigarettes   • Smokeless tobacco: Never Used      Comment: 2 ppd for 50 yrs   • Alcohol use 1.2 oz/week     2 Cans of beer per week      Comment: occassionally   • Drug use: Yes      Comment: Prescription  (morphine and norco)   • Sexual activity: Not on file     Other Topics Concern   • Not on file     Social History Narrative   • No narrative on file     Allergies   Allergen Reactions   • Darvocet [Propoxyphene N-Apap]    • Flexeril [Cyclobenzaprine Hcl]    • Nsaids Swelling     Fluid retention     Outpatient Encounter Prescriptions as of 5/8/2018   Medication Sig Dispense Refill   • losartan (COZAAR) 100 MG Tab Take 1 Tab by  mouth every day. 30 Tab 11   • labetalol (NORMODYNE) 300 MG Tab Take 1 Tab by mouth 2 times a day. 60 Tab 11   • aspirin EC (ECOTRIN) 81 MG Tablet Delayed Response Take 81 mg by mouth every day.     • atorvastatin (LIPITOR) 80 MG tablet      • cholecalciferol (VITAMIN D3) 400 UNIT Tab Take 400 Units by mouth every day.     • clopidogrel (PLAVIX) 75 MG Tab Take 1 Tab by mouth every day. 30 Tab 2   • amlodipine (NORVASC) 10 MG Tab Take 1 Tab by mouth every day. 30 Tab 1   • zolpidem (AMBIEN) 10 MG Tab Take 10 mg by mouth at bedtime as needed for Sleep.     • albuterol 108 (90 BASE) MCG/ACT Aero Soln inhalation aerosol Inhale 2 Puffs by mouth every 6 hours as needed for Shortness of Breath.     • morphine SR (MS CONTIN) 60 MG TB12 Take 1 Tab by mouth 3 times a day. 21 Each 0   • hydrocodone/apap  (NORCO)  MG TABS Take 1 Tab by mouth every 6 hours as needed for Mild Pain. 150 Each 1   • [DISCONTINUED] losartan (COZAAR) 50 MG Tab      • ADVAIR DISKUS 100-50 MCG/DOSE INH MISC Inhale 1 Puff by mouth 2 times a day as needed.       No facility-administered encounter medications on file as of 5/8/2018.      Review of Systems   Constitutional: Negative for chills, fever, malaise/fatigue and weight loss.   HENT: Negative for ear discharge, ear pain, hearing loss and nosebleeds.    Eyes: Negative for blurred vision, double vision, pain and discharge.   Respiratory: Negative for cough and shortness of breath.    Cardiovascular: Negative for chest pain, palpitations, orthopnea, claudication, leg swelling and PND.   Gastrointestinal: Negative for abdominal pain, blood in stool, melena, nausea and vomiting.   Genitourinary: Negative for dysuria and hematuria.   Musculoskeletal: Negative for falls, joint pain and myalgias.   Skin: Negative for itching and rash.   Neurological: Negative for dizziness, sensory change, speech change, loss of consciousness and headaches.   Endo/Heme/Allergies: Negative for environmental  "allergies. Does not bruise/bleed easily.   Psychiatric/Behavioral: Negative for depression, hallucinations and suicidal ideas.        Objective:   BP (!) 210/100   Pulse 86   Ht 1.753 m (5' 9\")   Wt 66 kg (145 lb 6.4 oz)   SpO2 95%   BMI 21.47 kg/m²     Physical Exam   Constitutional: He is oriented to person, place, and time. No distress.   HENT:   Head: Normocephalic and atraumatic.   Right Ear: External ear normal.   Left Ear: External ear normal.   Eyes: Right eye exhibits no discharge. Left eye exhibits no discharge.   Neck: No JVD present. No thyromegaly present.   Cardiovascular: Normal rate, regular rhythm, normal heart sounds and intact distal pulses.  Exam reveals no gallop and no friction rub.    No murmur heard.  Pulmonary/Chest: Breath sounds normal. No respiratory distress.   Abdominal: Bowel sounds are normal. He exhibits no distension. There is no tenderness.   Musculoskeletal: He exhibits no edema or tenderness.   Neurological: He is alert and oriented to person, place, and time. No cranial nerve deficit.   Skin: Skin is warm and dry. He is not diaphoretic.   Psychiatric: He has a normal mood and affect. His behavior is normal.       Assessment:     1. HTN (hypertension), malignant  losartan (COZAAR) 100 MG Tab    labetalol (NORMODYNE) 300 MG Tab   2. History of stroke  losartan (COZAAR) 100 MG Tab   3. High risk medication use         Medical Decision Making:  Today's Assessment / Status / Plan:   Advised patient to adhere to medical therapy.  Increase Losartan to 100 mg once a day.    Start Labetalol 300 mg twice a day.    Continue Amlodipine 10 mg po daily.    No indication for hospitalization yet.    I will see patient back in clinic with lab tests and studies results in 1 week.    I thank you Abel for referring patient to our Cardiology Clinic today.    "

## 2018-05-08 NOTE — LETTER
[facility]   [address] [place] [address] [address]  [place] [address]  Phone: [phone]  Fax: [phone]              Will Fuentes  1950    Encounter Date: 5/8/2018    Surya Jordan M.D.          PROGRESS NOTE:  Chief Complaint   Patient presents with   • Hypertension       Subjective:   Will Fuentes is a 67 y.o. male who presents today for cardiac care and evaluation due to uncontrolled hypertension. Patient also has a history of drug-seeking behavior along with chronic lower back pain. He is on chronic pain medication. He does not have any prior history of cardiac surgery or procedures.     At this time, he did not take his medications this AM. BP is very high. No symptoms however.    Past Medical History:   Diagnosis Date   • Breath shortness    • Chronic left shoulder pain     2003   • Chronic low back pain     2003   • COPD    • Drug-seeking behavior    • Heart burn    • Hypertension    • Hypertension    • Pain    • Psoriasis    • Psychiatric problem     depression     Past Surgical History:   Procedure Laterality Date   • OTHER ORTHOPEDIC SURGERY      back,   • OTHER ORTHOPEDIC SURGERY      rt. leg.  spiral fx. 1980's   • SHOULDER SURGERY      Bilateral with screws 1991     No family history on file.  Social History     Social History   • Marital status:      Spouse name: N/A   • Number of children: N/A   • Years of education: N/A     Occupational History   • Not on file.     Social History Main Topics   • Smoking status: Current Every Day Smoker     Packs/day: 0.50     Types: Cigarettes   • Smokeless tobacco: Never Used      Comment: 2 ppd for 50 yrs   • Alcohol use 1.2 oz/week     2 Cans of beer per week      Comment: occassionally   • Drug use: Yes      Comment: Prescription  (morphine and norco)   • Sexual activity: Not on file     Other Topics Concern   • Not on file     Social History Narrative   • No narrative on file     Allergies      Allergen Reactions   • Darvocet [Propoxyphene N-Apap]    • Flexeril [Cyclobenzaprine Hcl]    • Nsaids Swelling     Fluid retention     Outpatient Encounter Prescriptions as of 5/8/2018   Medication Sig Dispense Refill   • losartan (COZAAR) 100 MG Tab Take 1 Tab by mouth every day. 30 Tab 11   • labetalol (NORMODYNE) 300 MG Tab Take 1 Tab by mouth 2 times a day. 60 Tab 11   • aspirin EC (ECOTRIN) 81 MG Tablet Delayed Response Take 81 mg by mouth every day.     • atorvastatin (LIPITOR) 80 MG tablet      • cholecalciferol (VITAMIN D3) 400 UNIT Tab Take 400 Units by mouth every day.     • clopidogrel (PLAVIX) 75 MG Tab Take 1 Tab by mouth every day. 30 Tab 2   • amlodipine (NORVASC) 10 MG Tab Take 1 Tab by mouth every day. 30 Tab 1   • zolpidem (AMBIEN) 10 MG Tab Take 10 mg by mouth at bedtime as needed for Sleep.     • albuterol 108 (90 BASE) MCG/ACT Aero Soln inhalation aerosol Inhale 2 Puffs by mouth every 6 hours as needed for Shortness of Breath.     • morphine SR (MS CONTIN) 60 MG TB12 Take 1 Tab by mouth 3 times a day. 21 Each 0   • hydrocodone/apap  (NORCO)  MG TABS Take 1 Tab by mouth every 6 hours as needed for Mild Pain. 150 Each 1   • [DISCONTINUED] losartan (COZAAR) 50 MG Tab      • ADVAIR DISKUS 100-50 MCG/DOSE INH MISC Inhale 1 Puff by mouth 2 times a day as needed.       No facility-administered encounter medications on file as of 5/8/2018.      Review of Systems   Constitutional: Negative for chills, fever, malaise/fatigue and weight loss.   HENT: Negative for ear discharge, ear pain, hearing loss and nosebleeds.    Eyes: Negative for blurred vision, double vision, pain and discharge.   Respiratory: Negative for cough and shortness of breath.    Cardiovascular: Negative for chest pain, palpitations, orthopnea, claudication, leg swelling and PND.   Gastrointestinal: Negative for abdominal pain, blood in stool, melena, nausea and vomiting.   Genitourinary: Negative for dysuria and hematuria.  "  Musculoskeletal: Negative for falls, joint pain and myalgias.   Skin: Negative for itching and rash.   Neurological: Negative for dizziness, sensory change, speech change, loss of consciousness and headaches.   Endo/Heme/Allergies: Negative for environmental allergies. Does not bruise/bleed easily.   Psychiatric/Behavioral: Negative for depression, hallucinations and suicidal ideas.        Objective:   BP (!) 210/100   Pulse 86   Ht 1.753 m (5' 9\")   Wt 66 kg (145 lb 6.4 oz)   SpO2 95%   BMI 21.47 kg/m²      Physical Exam   Constitutional: He is oriented to person, place, and time. No distress.   HENT:   Head: Normocephalic and atraumatic.   Right Ear: External ear normal.   Left Ear: External ear normal.   Eyes: Right eye exhibits no discharge. Left eye exhibits no discharge.   Neck: No JVD present. No thyromegaly present.   Cardiovascular: Normal rate, regular rhythm, normal heart sounds and intact distal pulses.  Exam reveals no gallop and no friction rub.    No murmur heard.  Pulmonary/Chest: Breath sounds normal. No respiratory distress.   Abdominal: Bowel sounds are normal. He exhibits no distension. There is no tenderness.   Musculoskeletal: He exhibits no edema or tenderness.   Neurological: He is alert and oriented to person, place, and time. No cranial nerve deficit.   Skin: Skin is warm and dry. He is not diaphoretic.   Psychiatric: He has a normal mood and affect. His behavior is normal.       Assessment:     1. HTN (hypertension), malignant  losartan (COZAAR) 100 MG Tab    labetalol (NORMODYNE) 300 MG Tab   2. History of stroke  losartan (COZAAR) 100 MG Tab   3. High risk medication use         Medical Decision Making:  Today's Assessment / Status / Plan:   Advised patient to adhere to medical therapy.  Increase Losartan to 100 mg once a day.    Start Labetalol 300 mg twice a day.    Continue Amlodipine 10 mg po daily.    No indication for hospitalization yet.    I will see patient back in clinic " with lab tests and studies results in 1 week.    I thank you Abel for referring patient to our Cardiology Clinic today.        Judson Roman M.D.  [address] [place] [address]  VIA Facsimile: [phone]

## 2019-01-01 ENCOUNTER — APPOINTMENT (OUTPATIENT)
Dept: RADIOLOGY | Facility: MEDICAL CENTER | Age: 69
DRG: 056 | End: 2019-01-01
Attending: EMERGENCY MEDICINE
Payer: MEDICARE

## 2019-01-01 ENCOUNTER — HOSPITAL ENCOUNTER (INPATIENT)
Facility: MEDICAL CENTER | Age: 69
LOS: 3 days | DRG: 056 | End: 2019-07-02
Attending: EMERGENCY MEDICINE | Admitting: INTERNAL MEDICINE
Payer: MEDICARE

## 2019-01-01 VITALS
OXYGEN SATURATION: 40 % | HEIGHT: 71 IN | DIASTOLIC BLOOD PRESSURE: 58 MMHG | BODY MASS INDEX: 16.98 KG/M2 | WEIGHT: 121.25 LBS | HEART RATE: 118 BPM | SYSTOLIC BLOOD PRESSURE: 117 MMHG | RESPIRATION RATE: 25 BRPM | TEMPERATURE: 97.8 F

## 2019-01-01 DIAGNOSIS — R41.0 DELIRIUM: ICD-10-CM

## 2019-01-01 DIAGNOSIS — C34.90 PRIMARY MALIGNANT NEOPLASM OF LUNG METASTATIC TO OTHER SITE, UNSPECIFIED LATERALITY (HCC): ICD-10-CM

## 2019-01-01 DIAGNOSIS — R44.3 HALLUCINATIONS: ICD-10-CM

## 2019-01-01 LAB
ALBUMIN SERPL BCP-MCNC: 3.8 G/DL (ref 3.2–4.9)
ALBUMIN/GLOB SERPL: 0.7 G/DL
ALP SERPL-CCNC: 86 U/L (ref 30–99)
ALT SERPL-CCNC: 10 U/L (ref 2–50)
AMMONIA PLAS-SCNC: 19 UMOL/L (ref 11–45)
AMPHET UR QL SCN: NEGATIVE
ANION GAP SERPL CALC-SCNC: 10 MMOL/L (ref 0–11.9)
ANION GAP SERPL CALC-SCNC: 7 MMOL/L (ref 0–11.9)
APPEARANCE UR: CLEAR
APTT PPP: 26.7 SEC (ref 24.7–36)
AST SERPL-CCNC: 20 U/L (ref 12–45)
BACTERIA #/AREA URNS HPF: NEGATIVE /HPF
BACTERIA UR CULT: NORMAL
BARBITURATES UR QL SCN: NEGATIVE
BASOPHILS # BLD AUTO: 0.8 % (ref 0–1.8)
BASOPHILS # BLD: 0.08 K/UL (ref 0–0.12)
BENZODIAZ UR QL SCN: NEGATIVE
BILIRUB SERPL-MCNC: 0.6 MG/DL (ref 0.1–1.5)
BILIRUB UR QL STRIP.AUTO: NEGATIVE
BUN SERPL-MCNC: 18 MG/DL (ref 8–22)
BUN SERPL-MCNC: 23 MG/DL (ref 8–22)
BZE UR QL SCN: NEGATIVE
CALCIUM SERPL-MCNC: 10.3 MG/DL (ref 8.5–10.5)
CALCIUM SERPL-MCNC: 9.8 MG/DL (ref 8.5–10.5)
CANNABINOIDS UR QL SCN: NEGATIVE
CHLORIDE SERPL-SCNC: 100 MMOL/L (ref 96–112)
CHLORIDE SERPL-SCNC: 98 MMOL/L (ref 96–112)
CO2 SERPL-SCNC: 22 MMOL/L (ref 20–33)
CO2 SERPL-SCNC: 23 MMOL/L (ref 20–33)
COLOR UR: YELLOW
CREAT SERPL-MCNC: 0.96 MG/DL (ref 0.5–1.4)
CREAT SERPL-MCNC: 1.12 MG/DL (ref 0.5–1.4)
EOSINOPHIL # BLD AUTO: 0.11 K/UL (ref 0–0.51)
EOSINOPHIL NFR BLD: 1.2 % (ref 0–6.9)
EPI CELLS #/AREA URNS HPF: NEGATIVE /HPF
ERYTHROCYTE [DISTWIDTH] IN BLOOD BY AUTOMATED COUNT: 39.2 FL (ref 35.9–50)
ERYTHROCYTE [DISTWIDTH] IN BLOOD BY AUTOMATED COUNT: 40 FL (ref 35.9–50)
GLOBULIN SER CALC-MCNC: 5.1 G/DL (ref 1.9–3.5)
GLUCOSE SERPL-MCNC: 103 MG/DL (ref 65–99)
GLUCOSE SERPL-MCNC: 118 MG/DL (ref 65–99)
GLUCOSE UR STRIP.AUTO-MCNC: NEGATIVE MG/DL
HCT VFR BLD AUTO: 41.5 % (ref 42–52)
HCT VFR BLD AUTO: 42.7 % (ref 42–52)
HGB BLD-MCNC: 13.7 G/DL (ref 14–18)
HGB BLD-MCNC: 14.4 G/DL (ref 14–18)
HYALINE CASTS #/AREA URNS LPF: ABNORMAL /LPF
IMM GRANULOCYTES # BLD AUTO: 0.04 K/UL (ref 0–0.11)
IMM GRANULOCYTES NFR BLD AUTO: 0.4 % (ref 0–0.9)
INR PPP: 0.92 (ref 0.87–1.13)
KETONES UR STRIP.AUTO-MCNC: ABNORMAL MG/DL
LACTATE BLD-SCNC: 1.2 MMOL/L (ref 0.5–2)
LEUKOCYTE ESTERASE UR QL STRIP.AUTO: NEGATIVE
LYMPHOCYTES # BLD AUTO: 1.44 K/UL (ref 1–4.8)
LYMPHOCYTES NFR BLD: 15.2 % (ref 22–41)
MCH RBC QN AUTO: 29.3 PG (ref 27–33)
MCH RBC QN AUTO: 29.4 PG (ref 27–33)
MCHC RBC AUTO-ENTMCNC: 33 G/DL (ref 33.7–35.3)
MCHC RBC AUTO-ENTMCNC: 33.7 G/DL (ref 33.7–35.3)
MCV RBC AUTO: 87.3 FL (ref 81.4–97.8)
MCV RBC AUTO: 88.7 FL (ref 81.4–97.8)
METHADONE UR QL SCN: NEGATIVE
MICRO URNS: ABNORMAL
MONOCYTES # BLD AUTO: 0.83 K/UL (ref 0–0.85)
MONOCYTES NFR BLD AUTO: 8.8 % (ref 0–13.4)
NEUTROPHILS # BLD AUTO: 6.97 K/UL (ref 1.82–7.42)
NEUTROPHILS NFR BLD: 73.6 % (ref 44–72)
NITRITE UR QL STRIP.AUTO: NEGATIVE
NRBC # BLD AUTO: 0 K/UL
NRBC BLD-RTO: 0 /100 WBC
OPIATES UR QL SCN: POSITIVE
OXYCODONE UR QL SCN: NEGATIVE
PCP UR QL SCN: NEGATIVE
PH UR STRIP.AUTO: 5.5 [PH]
PLATELET # BLD AUTO: 216 K/UL (ref 164–446)
PLATELET # BLD AUTO: 238 K/UL (ref 164–446)
PMV BLD AUTO: 11.1 FL (ref 9–12.9)
PMV BLD AUTO: 11.3 FL (ref 9–12.9)
POTASSIUM SERPL-SCNC: 4.2 MMOL/L (ref 3.6–5.5)
POTASSIUM SERPL-SCNC: 4.9 MMOL/L (ref 3.6–5.5)
PROPOXYPH UR QL SCN: NEGATIVE
PROT SERPL-MCNC: 8.9 G/DL (ref 6–8.2)
PROT UR QL STRIP: 30 MG/DL
PROTHROMBIN TIME: 12.6 SEC (ref 12–14.6)
RBC # BLD AUTO: 4.68 M/UL (ref 4.7–6.1)
RBC # BLD AUTO: 4.89 M/UL (ref 4.7–6.1)
RBC # URNS HPF: ABNORMAL /HPF
RBC UR QL AUTO: ABNORMAL
SIGNIFICANT IND 70042: NORMAL
SITE SITE: NORMAL
SODIUM SERPL-SCNC: 130 MMOL/L (ref 135–145)
SODIUM SERPL-SCNC: 130 MMOL/L (ref 135–145)
SOURCE SOURCE: NORMAL
SP GR UR STRIP.AUTO: 1.02
UROBILINOGEN UR STRIP.AUTO-MCNC: 1 MG/DL
WBC # BLD AUTO: 10.7 K/UL (ref 4.8–10.8)
WBC # BLD AUTO: 9.5 K/UL (ref 4.8–10.8)
WBC #/AREA URNS HPF: ABNORMAL /HPF

## 2019-01-01 PROCEDURE — 94760 N-INVAS EAR/PLS OXIMETRY 1: CPT

## 2019-01-01 PROCEDURE — 770009 HCHG ROOM/CARE - ONCOLOGY SEMI PRI*

## 2019-01-01 PROCEDURE — 700111 HCHG RX REV CODE 636 W/ 250 OVERRIDE (IP): Performed by: INTERNAL MEDICINE

## 2019-01-01 PROCEDURE — 87086 URINE CULTURE/COLONY COUNT: CPT

## 2019-01-01 PROCEDURE — 99232 SBSQ HOSP IP/OBS MODERATE 35: CPT | Performed by: HOSPITALIST

## 2019-01-01 PROCEDURE — 99223 1ST HOSP IP/OBS HIGH 75: CPT | Performed by: INTERNAL MEDICINE

## 2019-01-01 PROCEDURE — A9270 NON-COVERED ITEM OR SERVICE: HCPCS | Performed by: INTERNAL MEDICINE

## 2019-01-01 PROCEDURE — 85610 PROTHROMBIN TIME: CPT

## 2019-01-01 PROCEDURE — 99233 SBSQ HOSP IP/OBS HIGH 50: CPT | Performed by: INTERNAL MEDICINE

## 2019-01-01 PROCEDURE — 85730 THROMBOPLASTIN TIME PARTIAL: CPT

## 2019-01-01 PROCEDURE — 99233 SBSQ HOSP IP/OBS HIGH 50: CPT | Performed by: HOSPITALIST

## 2019-01-01 PROCEDURE — 700102 HCHG RX REV CODE 250 W/ 637 OVERRIDE(OP): Performed by: INTERNAL MEDICINE

## 2019-01-01 PROCEDURE — 770004 HCHG ROOM/CARE - ONCOLOGY PRIVATE *

## 2019-01-01 PROCEDURE — 700102 HCHG RX REV CODE 250 W/ 637 OVERRIDE(OP): Performed by: HOSPITALIST

## 2019-01-01 PROCEDURE — 700111 HCHG RX REV CODE 636 W/ 250 OVERRIDE (IP): Performed by: HOSPITALIST

## 2019-01-01 PROCEDURE — 87040 BLOOD CULTURE FOR BACTERIA: CPT

## 2019-01-01 PROCEDURE — 36415 COLL VENOUS BLD VENIPUNCTURE: CPT

## 2019-01-01 PROCEDURE — A9270 NON-COVERED ITEM OR SERVICE: HCPCS | Performed by: HOSPITALIST

## 2019-01-01 PROCEDURE — 700105 HCHG RX REV CODE 258: Performed by: INTERNAL MEDICINE

## 2019-01-01 PROCEDURE — 81001 URINALYSIS AUTO W/SCOPE: CPT

## 2019-01-01 PROCEDURE — 70450 CT HEAD/BRAIN W/O DYE: CPT

## 2019-01-01 PROCEDURE — 99285 EMERGENCY DEPT VISIT HI MDM: CPT

## 2019-01-01 PROCEDURE — 700105 HCHG RX REV CODE 258: Performed by: EMERGENCY MEDICINE

## 2019-01-01 PROCEDURE — 80048 BASIC METABOLIC PNL TOTAL CA: CPT

## 2019-01-01 PROCEDURE — 71045 X-RAY EXAM CHEST 1 VIEW: CPT

## 2019-01-01 PROCEDURE — 80053 COMPREHEN METABOLIC PANEL: CPT

## 2019-01-01 PROCEDURE — 80307 DRUG TEST PRSMV CHEM ANLYZR: CPT

## 2019-01-01 PROCEDURE — 82140 ASSAY OF AMMONIA: CPT

## 2019-01-01 PROCEDURE — 83605 ASSAY OF LACTIC ACID: CPT

## 2019-01-01 PROCEDURE — 85025 COMPLETE CBC W/AUTO DIFF WBC: CPT

## 2019-01-01 PROCEDURE — 700101 HCHG RX REV CODE 250: Performed by: HOSPITALIST

## 2019-01-01 PROCEDURE — 85027 COMPLETE CBC AUTOMATED: CPT

## 2019-01-01 RX ORDER — ONDANSETRON 2 MG/ML
4 INJECTION INTRAMUSCULAR; INTRAVENOUS EVERY 4 HOURS PRN
Status: DISCONTINUED | OUTPATIENT
Start: 2019-01-01 | End: 2019-07-03 | Stop reason: HOSPADM

## 2019-01-01 RX ORDER — ATROPINE SULFATE 10 MG/ML
2 SOLUTION/ DROPS OPHTHALMIC EVERY 4 HOURS PRN
Status: DISCONTINUED | OUTPATIENT
Start: 2019-01-01 | End: 2019-07-03 | Stop reason: HOSPADM

## 2019-01-01 RX ORDER — MORPHINE SULFATE 10 MG/ML
10-20 INJECTION, SOLUTION INTRAMUSCULAR; INTRAVENOUS
Status: DISCONTINUED | OUTPATIENT
Start: 2019-01-01 | End: 2019-07-03 | Stop reason: HOSPADM

## 2019-01-01 RX ORDER — SODIUM CHLORIDE 9 MG/ML
1000 INJECTION, SOLUTION INTRAVENOUS ONCE
Status: COMPLETED | OUTPATIENT
Start: 2019-01-01 | End: 2019-01-01

## 2019-01-01 RX ORDER — NICOTINE 21 MG/24HR
1 PATCH, TRANSDERMAL 24 HOURS TRANSDERMAL ONCE
Status: DISCONTINUED | OUTPATIENT
Start: 2019-01-01 | End: 2019-01-01

## 2019-01-01 RX ORDER — LORAZEPAM 2 MG/ML
1 CONCENTRATE ORAL
Status: DISCONTINUED | OUTPATIENT
Start: 2019-01-01 | End: 2019-07-03 | Stop reason: HOSPADM

## 2019-01-01 RX ORDER — SODIUM CHLORIDE 9 MG/ML
INJECTION, SOLUTION INTRAVENOUS CONTINUOUS
Status: DISCONTINUED | OUTPATIENT
Start: 2019-01-01 | End: 2019-01-01

## 2019-01-01 RX ORDER — ACETAMINOPHEN 325 MG/1
650 TABLET ORAL EVERY 6 HOURS PRN
Status: DISCONTINUED | OUTPATIENT
Start: 2019-01-01 | End: 2019-07-03 | Stop reason: HOSPADM

## 2019-01-01 RX ORDER — HYDROMORPHONE HYDROCHLORIDE 1 MG/ML
0.5 INJECTION, SOLUTION INTRAMUSCULAR; INTRAVENOUS; SUBCUTANEOUS
Status: DISCONTINUED | OUTPATIENT
Start: 2019-01-01 | End: 2019-01-01

## 2019-01-01 RX ORDER — CARVEDILOL 3.12 MG/1
3.12 TABLET ORAL 2 TIMES DAILY WITH MEALS
Status: DISCONTINUED | OUTPATIENT
Start: 2019-01-01 | End: 2019-01-01

## 2019-01-01 RX ORDER — FUROSEMIDE 40 MG/1
40 TABLET ORAL DAILY
COMMUNITY

## 2019-01-01 RX ORDER — MORPHINE SULFATE 100 MG/5ML
10 SOLUTION ORAL
Status: DISCONTINUED | OUTPATIENT
Start: 2019-01-01 | End: 2019-07-03 | Stop reason: HOSPADM

## 2019-01-01 RX ORDER — DOXEPIN HYDROCHLORIDE 100 MG/1
100 CAPSULE ORAL NIGHTLY
COMMUNITY

## 2019-01-01 RX ORDER — SPIRONOLACTONE 25 MG/1
25 TABLET ORAL DAILY
COMMUNITY

## 2019-01-01 RX ORDER — OXYCODONE HYDROCHLORIDE 10 MG/1
10 TABLET ORAL
Status: DISCONTINUED | OUTPATIENT
Start: 2019-01-01 | End: 2019-01-01

## 2019-01-01 RX ORDER — ONDANSETRON 4 MG/1
4 TABLET, ORALLY DISINTEGRATING ORAL EVERY 4 HOURS PRN
Status: DISCONTINUED | OUTPATIENT
Start: 2019-01-01 | End: 2019-07-03 | Stop reason: HOSPADM

## 2019-01-01 RX ORDER — HALOPERIDOL 5 MG/ML
2-5 INJECTION INTRAMUSCULAR EVERY 4 HOURS PRN
Status: DISCONTINUED | OUTPATIENT
Start: 2019-01-01 | End: 2019-07-03 | Stop reason: HOSPADM

## 2019-01-01 RX ORDER — LORAZEPAM 2 MG/ML
1 INJECTION INTRAMUSCULAR
Status: DISCONTINUED | OUTPATIENT
Start: 2019-01-01 | End: 2019-07-03 | Stop reason: HOSPADM

## 2019-01-01 RX ORDER — BISACODYL 10 MG
10 SUPPOSITORY, RECTAL RECTAL
Status: DISCONTINUED | OUTPATIENT
Start: 2019-01-01 | End: 2019-01-01

## 2019-01-01 RX ORDER — LISINOPRIL 2.5 MG/1
2.5 TABLET ORAL DAILY
Status: DISCONTINUED | OUTPATIENT
Start: 2019-01-01 | End: 2019-01-01

## 2019-01-01 RX ORDER — AMOXICILLIN 250 MG
2 CAPSULE ORAL 2 TIMES DAILY
Status: DISCONTINUED | OUTPATIENT
Start: 2019-01-01 | End: 2019-01-01

## 2019-01-01 RX ORDER — LISINOPRIL 2.5 MG/1
2.5 TABLET ORAL DAILY
COMMUNITY

## 2019-01-01 RX ORDER — MORPHINE SULFATE 100 MG/1
100 TABLET ORAL EVERY 8 HOURS
COMMUNITY

## 2019-01-01 RX ORDER — GLYCOPYRROLATE 0.2 MG/ML
0.2 INJECTION INTRAMUSCULAR; INTRAVENOUS 3 TIMES DAILY PRN
Status: DISCONTINUED | OUTPATIENT
Start: 2019-01-01 | End: 2019-07-03 | Stop reason: HOSPADM

## 2019-01-01 RX ORDER — CARVEDILOL 3.12 MG/1
3.12 TABLET ORAL 2 TIMES DAILY WITH MEALS
COMMUNITY

## 2019-01-01 RX ORDER — TAMSULOSIN HYDROCHLORIDE 0.4 MG/1
0.4 CAPSULE ORAL
Status: DISCONTINUED | OUTPATIENT
Start: 2019-01-01 | End: 2019-01-01

## 2019-01-01 RX ORDER — FINASTERIDE 5 MG/1
5 TABLET, FILM COATED ORAL DAILY
COMMUNITY

## 2019-01-01 RX ORDER — MORPHINE SULFATE 10 MG/ML
10 INJECTION, SOLUTION INTRAMUSCULAR; INTRAVENOUS
Status: DISCONTINUED | OUTPATIENT
Start: 2019-01-01 | End: 2019-01-01

## 2019-01-01 RX ORDER — DOXEPIN HYDROCHLORIDE 100 MG/1
100 CAPSULE ORAL NIGHTLY
Status: DISCONTINUED | OUTPATIENT
Start: 2019-01-01 | End: 2019-01-01

## 2019-01-01 RX ORDER — TAMSULOSIN HYDROCHLORIDE 0.4 MG/1
0.4 CAPSULE ORAL
COMMUNITY

## 2019-01-01 RX ORDER — NICOTINE 21 MG/24HR
21 PATCH, TRANSDERMAL 24 HOURS TRANSDERMAL
Status: DISCONTINUED | OUTPATIENT
Start: 2019-01-01 | End: 2019-01-01

## 2019-01-01 RX ORDER — OXYCODONE HYDROCHLORIDE 5 MG/1
5 TABLET ORAL
Status: DISCONTINUED | OUTPATIENT
Start: 2019-01-01 | End: 2019-01-01

## 2019-01-01 RX ORDER — FINASTERIDE 5 MG/1
5 TABLET, FILM COATED ORAL DAILY
Status: DISCONTINUED | OUTPATIENT
Start: 2019-01-01 | End: 2019-01-01

## 2019-01-01 RX ORDER — POLYETHYLENE GLYCOL 3350 17 G/17G
1 POWDER, FOR SOLUTION ORAL
Status: DISCONTINUED | OUTPATIENT
Start: 2019-01-01 | End: 2019-01-01

## 2019-01-01 RX ORDER — ENALAPRILAT 1.25 MG/ML
1.25 INJECTION INTRAVENOUS EVERY 6 HOURS PRN
Status: DISCONTINUED | OUTPATIENT
Start: 2019-01-01 | End: 2019-07-03 | Stop reason: HOSPADM

## 2019-01-01 RX ORDER — MORPHINE SULFATE 100 MG/1
100 TABLET ORAL EVERY 8 HOURS
Status: DISCONTINUED | OUTPATIENT
Start: 2019-01-01 | End: 2019-01-01

## 2019-01-01 RX ORDER — SCOLOPAMINE TRANSDERMAL SYSTEM 1 MG/1
1 PATCH, EXTENDED RELEASE TRANSDERMAL
Status: DISCONTINUED | OUTPATIENT
Start: 2019-01-01 | End: 2019-07-03 | Stop reason: HOSPADM

## 2019-01-01 RX ADMIN — MORPHINE SULFATE 10 MG: 10 INJECTION INTRAVENOUS at 15:58

## 2019-01-01 RX ADMIN — ATROPINE SULFATE 2 DROP: 10 SOLUTION OPHTHALMIC at 16:51

## 2019-01-01 RX ADMIN — MORPHINE SULFATE 10 MG: 10 INJECTION INTRAVENOUS at 04:41

## 2019-01-01 RX ADMIN — HYDROMORPHONE HYDROCHLORIDE 0.5 MG: 1 INJECTION, SOLUTION INTRAMUSCULAR; INTRAVENOUS; SUBCUTANEOUS at 08:42

## 2019-01-01 RX ADMIN — ATROPINE SULFATE 2 DROP: 10 SOLUTION OPHTHALMIC at 12:39

## 2019-01-01 RX ADMIN — MORPHINE SULFATE 10 MG: 10 INJECTION INTRAVENOUS at 21:14

## 2019-01-01 RX ADMIN — GLYCOPYRROLATE 0.2 MG: 0.2 INJECTION INTRAMUSCULAR; INTRAVENOUS at 05:19

## 2019-01-01 RX ADMIN — MORPHINE SULFATE 10 MG: 10 INJECTION INTRAVENOUS at 17:56

## 2019-01-01 RX ADMIN — MORPHINE SULFATE 10 MG: 10 INJECTION INTRAVENOUS at 00:33

## 2019-01-01 RX ADMIN — LORAZEPAM 1 MG: 2 SOLUTION, CONCENTRATE ORAL at 18:49

## 2019-01-01 RX ADMIN — MORPHINE SULFATE 10 MG: 10 INJECTION INTRAVENOUS at 11:21

## 2019-01-01 RX ADMIN — SODIUM CHLORIDE 1000 ML: 9 INJECTION, SOLUTION INTRAVENOUS at 18:42

## 2019-01-01 RX ADMIN — ATROPINE SULFATE 2 DROP: 10 SOLUTION OPHTHALMIC at 01:14

## 2019-01-01 RX ADMIN — GLYCOPYRROLATE 0.2 MG: 0.2 INJECTION INTRAMUSCULAR; INTRAVENOUS at 07:10

## 2019-01-01 RX ADMIN — MORPHINE SULFATE 10 MG: 10 INJECTION INTRAVENOUS at 11:53

## 2019-01-01 RX ADMIN — LORAZEPAM 1 MG: 2 INJECTION INTRAMUSCULAR; INTRAVENOUS at 21:15

## 2019-01-01 RX ADMIN — MORPHINE SULFATE 10 MG: 10 INJECTION INTRAVENOUS at 23:50

## 2019-01-01 RX ADMIN — NICOTINE 21 MG: 21 PATCH, EXTENDED RELEASE TRANSDERMAL at 21:49

## 2019-01-01 RX ADMIN — MORPHINE SULFATE 10 MG: 10 INJECTION INTRAVENOUS at 14:16

## 2019-01-01 RX ADMIN — MORPHINE SULFATE 10 MG: 10 INJECTION INTRAVENOUS at 12:45

## 2019-01-01 RX ADMIN — LORAZEPAM 1 MG: 2 INJECTION INTRAMUSCULAR; INTRAVENOUS at 04:41

## 2019-01-01 RX ADMIN — HYDROMORPHONE HYDROCHLORIDE 0.5 MG: 1 INJECTION, SOLUTION INTRAMUSCULAR; INTRAVENOUS; SUBCUTANEOUS at 23:02

## 2019-01-01 RX ADMIN — MORPHINE SULFATE 10 MG: 10 INJECTION INTRAVENOUS at 15:29

## 2019-01-01 RX ADMIN — ATROPINE SULFATE 2 DROP: 10 SOLUTION OPHTHALMIC at 09:03

## 2019-01-01 RX ADMIN — MORPHINE SULFATE 5 MG: 10 INJECTION INTRAVENOUS at 12:22

## 2019-01-01 RX ADMIN — LORAZEPAM 1 MG: 2 INJECTION INTRAMUSCULAR; INTRAVENOUS at 10:19

## 2019-01-01 RX ADMIN — HYDROMORPHONE HYDROCHLORIDE 0.5 MG: 1 INJECTION, SOLUTION INTRAMUSCULAR; INTRAVENOUS; SUBCUTANEOUS at 03:58

## 2019-01-01 RX ADMIN — GLYCOPYRROLATE 0.2 MG: 0.2 INJECTION INTRAMUSCULAR; INTRAVENOUS at 13:24

## 2019-01-01 RX ADMIN — MORPHINE SULFATE 10 MG: 10 INJECTION INTRAVENOUS at 18:49

## 2019-01-01 RX ADMIN — MORPHINE SULFATE 10 MG: 10 INJECTION INTRAVENOUS at 07:10

## 2019-01-01 RX ADMIN — MORPHINE SULFATE 10 MG: 10 INJECTION INTRAVENOUS at 02:47

## 2019-01-01 RX ADMIN — MORPHINE SULFATE 10 MG: 10 INJECTION INTRAVENOUS at 08:16

## 2019-01-01 RX ADMIN — HALOPERIDOL LACTATE 5 MG: 5 INJECTION, SOLUTION INTRAMUSCULAR at 05:29

## 2019-01-01 RX ADMIN — LORAZEPAM 1 MG: 2 INJECTION INTRAMUSCULAR; INTRAVENOUS at 23:49

## 2019-01-01 RX ADMIN — MORPHINE SULFATE 10 MG: 10 INJECTION INTRAVENOUS at 16:51

## 2019-01-01 RX ADMIN — MORPHINE SULFATE 10 MG: 10 INJECTION INTRAVENOUS at 13:41

## 2019-01-01 RX ADMIN — MORPHINE SULFATE 10 MG: 100 SOLUTION ORAL at 10:49

## 2019-01-01 RX ADMIN — LORAZEPAM 1 MG: 2 INJECTION INTRAMUSCULAR; INTRAVENOUS at 02:48

## 2019-01-01 RX ADMIN — ATROPINE SULFATE 2 DROP: 10 SOLUTION OPHTHALMIC at 14:47

## 2019-01-01 RX ADMIN — ATROPINE SULFATE 2 DROP: 10 SOLUTION OPHTHALMIC at 10:02

## 2019-01-01 RX ADMIN — GLYCOPYRROLATE 0.2 MG: 0.2 INJECTION INTRAMUSCULAR; INTRAVENOUS at 23:49

## 2019-01-01 RX ADMIN — GLYCOPYRROLATE 0.2 MG: 0.2 INJECTION INTRAMUSCULAR; INTRAVENOUS at 21:18

## 2019-01-01 RX ADMIN — LORAZEPAM 1 MG: 2 INJECTION INTRAMUSCULAR; INTRAVENOUS at 05:19

## 2019-01-01 RX ADMIN — SCOPALAMINE 1 PATCH: 1 PATCH, EXTENDED RELEASE TRANSDERMAL at 10:02

## 2019-01-01 RX ADMIN — SODIUM CHLORIDE: 9 INJECTION, SOLUTION INTRAVENOUS at 23:09

## 2019-01-01 RX ADMIN — MORPHINE SULFATE 10 MG: 10 INJECTION INTRAVENOUS at 05:20

## 2019-01-01 RX ADMIN — LORAZEPAM 1 MG: 2 SOLUTION, CONCENTRATE ORAL at 14:47

## 2019-01-01 RX ADMIN — HALOPERIDOL LACTATE 2 MG: 5 INJECTION, SOLUTION INTRAMUSCULAR at 01:19

## 2019-01-01 RX ADMIN — MORPHINE SULFATE 10 MG: 10 INJECTION INTRAVENOUS at 18:28

## 2019-01-01 RX ADMIN — MORPHINE SULFATE 10 MG: 10 INJECTION INTRAVENOUS at 09:02

## 2019-01-01 RX ADMIN — GLYCOPYRROLATE 0.2 MG: 0.2 INJECTION INTRAMUSCULAR; INTRAVENOUS at 12:23

## 2019-01-01 RX ADMIN — ATROPINE SULFATE 2 DROP: 10 SOLUTION OPHTHALMIC at 08:13

## 2019-01-01 RX ADMIN — MORPHINE SULFATE 10 MG: 10 INJECTION INTRAVENOUS at 09:48

## 2019-01-01 RX ADMIN — MORPHINE SULFATE 10 MG: 10 INJECTION INTRAVENOUS at 15:59

## 2019-01-01 RX ADMIN — MORPHINE SULFATE 10 MG: 10 INJECTION INTRAVENOUS at 21:15

## 2019-01-01 RX ADMIN — LORAZEPAM 1 MG: 2 SOLUTION, CONCENTRATE ORAL at 10:02

## 2019-01-01 ASSESSMENT — COGNITIVE AND FUNCTIONAL STATUS - GENERAL
SUGGESTED CMS G CODE MODIFIER MOBILITY: CM
CLIMB 3 TO 5 STEPS WITH RAILING: TOTAL
SUGGESTED CMS G CODE MODIFIER DAILY ACTIVITY: CN
PERSONAL GROOMING: TOTAL
STANDING UP FROM CHAIR USING ARMS: TOTAL
WALKING IN HOSPITAL ROOM: TOTAL
EATING MEALS: TOTAL
MOBILITY SCORE: 7
MOVING FROM LYING ON BACK TO SITTING ON SIDE OF FLAT BED: UNABLE
MOVING TO AND FROM BED TO CHAIR: UNABLE
DRESSING REGULAR LOWER BODY CLOTHING: TOTAL
TOILETING: TOTAL
TURNING FROM BACK TO SIDE WHILE IN FLAT BAD: A LOT
HELP NEEDED FOR BATHING: TOTAL
DRESSING REGULAR UPPER BODY CLOTHING: TOTAL
DAILY ACTIVITIY SCORE: 6

## 2019-01-01 ASSESSMENT — LIFESTYLE VARIABLES: EVER_SMOKED: YES

## 2019-01-01 ASSESSMENT — PATIENT HEALTH QUESTIONNAIRE - PHQ9
2. FEELING DOWN, DEPRESSED, IRRITABLE, OR HOPELESS: SEVERAL DAYS
SUM OF ALL RESPONSES TO PHQ9 QUESTIONS 1 AND 2: 2
6. FEELING BAD ABOUT YOURSELF - OR THAT YOU ARE A FAILURE OR HAVE LET YOURSELF OR YOUR FAMILY DOWN: NOT AL ALL
3. TROUBLE FALLING OR STAYING ASLEEP OR SLEEPING TOO MUCH: NOT AT ALL
8. MOVING OR SPEAKING SO SLOWLY THAT OTHER PEOPLE COULD HAVE NOTICED. OR THE OPPOSITE, BEING SO FIGETY OR RESTLESS THAT YOU HAVE BEEN MOVING AROUND A LOT MORE THAN USUAL: NOT AT ALL
9. THOUGHTS THAT YOU WOULD BE BETTER OFF DEAD, OR OF HURTING YOURSELF: NOT AT ALL
1. LITTLE INTEREST OR PLEASURE IN DOING THINGS: SEVERAL DAYS
4. FEELING TIRED OR HAVING LITTLE ENERGY: SEVERAL DAYS

## 2019-06-30 PROBLEM — C34.90 METASTATIC LUNG CANCER (METASTASIS FROM LUNG TO OTHER SITE) (HCC): Status: ACTIVE | Noted: 2019-01-01

## 2019-06-30 PROBLEM — G93.41 ACUTE METABOLIC ENCEPHALOPATHY: Status: ACTIVE | Noted: 2019-01-01

## 2019-06-30 PROBLEM — R40.4 ALTERED LEVEL OF CONSCIOUSNESS: Status: ACTIVE | Noted: 2019-01-01

## 2019-06-30 PROBLEM — G91.1 OBSTRUCTIVE HYDROCEPHALUS (HCC): Status: ACTIVE | Noted: 2019-01-01

## 2019-06-30 NOTE — ASSESSMENT & PLAN NOTE
Brainstem metastatic lesion, patient's family does not want any aggressive intervention they just want him to be comfortable

## 2019-06-30 NOTE — DIETARY
Brief Nutrition Services Note: Pt with low BMI (<19); however, pt now comfort care and RD will not be following. Consult RD if pt's status changes, RD available PRN.

## 2019-06-30 NOTE — ED NOTES
Med Rec Updated and Complete per Pt's Home Pharmacy.  Allergies Reviewed  No PO ABX last 14 days.    Pt filled a 30-day supply of Plavix back in 02/2019 but has not filled any since.    Pt taking LD ASA daily.

## 2019-06-30 NOTE — ASSESSMENT & PLAN NOTE
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  Poor prognosis  Comfort care measures

## 2019-06-30 NOTE — PROGRESS NOTES
2 RN skin check complete with Emery, RN.   Devices in place: nasal cannula.  Skin assessed under devices: intact.  Confirmed pressure ulcers found on: n/a.  New potential pressure ulcers noted on: n/a.   Midline buttocks very red, slight [place].  Blisters on bilateral plantar feet.  Urethral area is red with scant raquel blood.  The following interventions in place: patient turns self at this time, pillows in use for positioning, condom catheter in use, barrier wipes and cream in use.

## 2019-06-30 NOTE — H&P
Hospital Medicine History & Physical Note    Date of Service  6/29/2019    Primary Care Physician  Judson Roman M.D.    Consultants  None    Code Status  DNR    Chief Complaint  Altered mental status    History of Presenting Illness  68 y.o. male who presented 6/29/2019 with altered mental status.  Patient does have a history of metastatic lung cancer, diagnosed a year ago, no treatment was offered, apparently no treatment was available due to the late finding.  He has had significant weight loss over the last year.  In the past patient has been walking and talking as well as driving.  Over the last 3 days he has been doing much worse, became very fidgety and confused, started hallucinating, mentation was waxing and waning but has been worsening over this timeframe.  He is certainly not walking and is hardly eating anything.  He has however still smoking.  I did discuss the case including labs and imaging with the ER physician.  I also discussed imaging with the radiologist.    Review of Systems  Review of Systems   Unable to perform ROS: Acuity of condition       Past Medical History   has a past medical history of Breath shortness; Chronic left shoulder pain; Chronic low back pain; COPD; Drug-seeking behavior; Heart burn; Hypertension; Hypertension; Pain; Psoriasis; and Psychiatric problem.    Surgical History   has a past surgical history that includes other orthopedic surgery; shoulder surgery; and other orthopedic surgery.     Family History  Reviewed, noncontributory    Social History   reports that he has been smoking Cigarettes.  He has been smoking about 0.50 packs per day. He has never used smokeless tobacco. He reports that he drinks about 1.2 oz of alcohol per week . He reports that he uses drugs.    Allergies  Allergies   Allergen Reactions   • Darvocet [Propoxyphene N-Apap]    • Flexeril [Cyclobenzaprine Hcl]    • Nsaids Swelling     Fluid retention       Medications  Prior to Admission  Medications   Prescriptions Last Dose Informant Patient Reported? Taking?   Tiotropium Bromide Monohydrate (SPIRIVA RESPIMAT) 2.5 MCG/ACT Aero Soln 6/28/2019 at AM Patient's Home Pharmacy Yes Yes   Sig: Inhale 2 Puffs by mouth every day.   aspirin EC (ECOTRIN) 81 MG Tablet Delayed Response 6/28/2019 at AM Patient's Home Pharmacy Yes No   Sig: Take 81 mg by mouth every day.   carvedilol (COREG) 3.125 MG Tab 6/28/2019 at PM Patient's Home Pharmacy Yes Yes   Sig: Take 3.125 mg by mouth 2 times a day, with meals.   doxepin (SINEQUAN) 100 MG Cap 6/28/2019 at PM Patient's Home Pharmacy Yes Yes   Sig: Take 100 mg by mouth every evening.   finasteride (PROSCAR) 5 MG Tab 6/28/2019 at AM Patient's Home Pharmacy Yes Yes   Sig: Take 5 mg by mouth every day.   furosemide (LASIX) 40 MG Tab 6/28/2019 at AM Patient's Home Pharmacy Yes Yes   Sig: Take 40 mg by mouth every day.   hydrocodone/apap  (NORCO)  MG TABS 6/29/2019 at PRN Patient's Home Pharmacy No No   Sig: Take 1 Tab by mouth every 6 hours as needed for Mild Pain.   lisinopril (PRINIVIL) 2.5 MG Tab 6/28/2019 at AM Patient's Home Pharmacy Yes Yes   Sig: Take 2.5 mg by mouth every day.   morphine SR (MS CONTIN) 100 MG Tab CR tablet 6/29/2019 at AM Patient's Home Pharmacy Yes Yes   Sig: Take 100 mg by mouth every 8 hours.   spironolactone (ALDACTONE) 25 MG Tab 6/28/2019 at AM Patient's Home Pharmacy Yes Yes   Sig: Take 25 mg by mouth every day.   tamsulosin (FLOMAX) 0.4 MG capsule 6/28/2019 at AM Patient's Home Pharmacy Yes Yes   Sig: Take 0.4 mg by mouth ONE-HALF HOUR AFTER BREAKFAST.   zolpidem (AMBIEN) 10 MG Tab 6/28/2019 at PM Patient's Home Pharmacy Yes No   Sig: Take 15 mg by mouth at bedtime as needed for Sleep.      Facility-Administered Medications: None       Physical Exam  Temp:  [37 °C (98.6 °F)] 37 °C (98.6 °F)  Pulse:  [53-87] 53  Resp:  [16-22] 16  BP: (179)/(85) 179/85  SpO2:  [95 %-96 %] 95 %    Physical Exam   Constitutional: He appears  cachectic.  Non-toxic appearance. He appears ill. No distress.   HENT:   Head: Normocephalic and atraumatic. Not macrocephalic and not microcephalic. Head is without raccoon's eyes and without Germain's sign.   Right Ear: External ear normal.   Left Ear: External ear normal.   Mouth/Throat: Mucous membranes are dry. No oropharyngeal exudate.   Eyes: Conjunctivae are normal. Right eye exhibits no discharge. Left eye exhibits no discharge. No scleral icterus.   Neck: Normal range of motion. Neck supple. No tracheal deviation, no edema and no erythema present.   Cardiovascular: Normal rate, regular rhythm, normal heart sounds and intact distal pulses.  Exam reveals no gallop, no friction rub and no decreased pulses.    No murmur heard.  Pulmonary/Chest: Effort normal and breath sounds normal. No stridor. No respiratory distress. He has no decreased breath sounds. He has no wheezes. He has no rhonchi. He has no rales.   Abdominal: Soft. Bowel sounds are normal. He exhibits no distension. There is no splenomegaly or hepatomegaly.   Musculoskeletal: He exhibits no edema.   Lymphadenopathy:     He has no cervical adenopathy.   Neurological: He is unresponsive.   Somnolent, nonverbal    Skin: Skin is warm and dry. No rash noted. He is not diaphoretic. No cyanosis or erythema. No pallor. Nails show no clubbing.   Psychiatric: He is noncommunicative.   Nursing note and vitals reviewed.      Laboratory:  Recent Labs      06/29/19   1736   WBC  9.5   RBC  4.89   HEMOGLOBIN  14.4   HEMATOCRIT  42.7   MCV  87.3   MCH  29.4   MCHC  33.7   RDW  39.2   PLATELETCT  238   MPV  11.3     Recent Labs      06/29/19   1736   SODIUM  130*   POTASSIUM  4.9   CHLORIDE  98   CO2  22   GLUCOSE  118*   BUN  23*   CREATININE  1.12   CALCIUM  10.3     Recent Labs      06/29/19   1736   ALTSGPT  10   ASTSGOT  20   ALKPHOSPHAT  86   TBILIRUBIN  0.6   GLUCOSE  118*     Recent Labs      06/29/19   1736   APTT  26.7   INR  0.92             No results  for input(s): TROPONINI in the last 72 hours.    Urinalysis:    Recent Labs      06/29/19   1837   SPECGRAVITY  1.021   GLUCOSEUR  Negative   KETONES  Trace*   NITRITE  Negative   LEUKESTERAS  Negative   WBCURINE  0-2*   RBCURINE  10-20*   BACTERIA  Negative   EPITHELCELL  Negative        Imaging:  CT-HEAD W/O   Final Result      1.  Acute obstructive hydrocephalus secondary to probable right cerebellar or cerebellar vermian mass measuring up to 3.5 cm with marked adjacent cerebellar and brainstem edema resulting in effacement of the basal cisterns and fourth ventricle. Finding    could be secondary to primary or metastatic neoplasm. Less likely, subacute hemorrhage could have this appearance as well.   2.  Mild diffuse cerebral substance loss.         Attempts to convey these findings to Dr. GINA POLLOCK were initiated on 6/29/2019 9:24 PM. These findings were discussed with Dr. Randall for Dr. GINA POLLOCK on 6/29/2019 9:50 PM. Emergent neurosurgical consultation was recommended.      DX-CHEST-PORTABLE (1 VIEW)   Final Result      1.  There are multiple bilateral pulmonary nodules, new since the prior chest x-ray and increased in size since the recent PET/CT. These could represent metastatic nodules versus septic emboli.   2.  Fullness in the mediastinum which could be due to vascular prominence or adenopathy as was seen on prior PET/CT.            Assessment/Plan:  I anticipate this patient will require at least two midnights for appropriate medical management, necessitating inpatient admission.    * Acute metabolic encephalopathy- (present on admission)   Assessment & Plan    -Due to his metastatic cancer which is now likely metastasized to his brain with a cerebellar mass causing obstructive hydrocephalus  -I did discuss this finding with the family, they do not want any surgical intervention  -I have ordered palliative care as well as hospice  -Family has been talking to hospice as well who will see them  tomorrow  -Patient is a very poor prognosis and family is aware of the new finding and agree with DNR     Obstructive hydrocephalus   Assessment & Plan    -I did personally review the CT of the head, noted the hydrocephalus which is significant  -This is now causing his altered mentation  -Family does not want surgery which I feel is appropriate  -Patient currently is more lethargic, was recently just agitated, at this point in time he is not safe to eat, if he wakes up we can reevaluate     Hyponatremia- (present on admission)   Assessment & Plan    -Mild, likely due to dehydration  -Start IV fluids     Metastatic lung cancer (metastasis from lung to other site) (HCC)   Assessment & Plan    -Not likely metastasized to the brain  -Patient has not had treatment since diagnosis approximately a year ago  -At this point in time family does not want treatment and patient will go home with hospice or transition to hospice     Hypertension- (present on admission)   Assessment & Plan    -At this point in time his mentation is such he will eat, he is not even waking up, all of his antihypertensives are oral, start PRN enalapril  -Adjust as needed     Hyperglycemia- (present on admission)   Assessment & Plan    -Mild, no need for coverage         VTE prophylaxis: SCDs

## 2019-06-30 NOTE — DISCHARGE PLANNING
Medical Social Work    MSW received a call from bedside RN that pt's wife is interested in Hospice information.  MSW met with pt's wife, Zakiya and multiple other family members including pt's son.  MSW provided pt's family with Hospice choice form.  They will review the choices and will let nursing staff know when they make a choice.   Pt's wife was on Hospice herself but cannot remember the name of the agency.  No further questions at this time.    Plan: SW will follow pending Hospice choice.

## 2019-06-30 NOTE — ED NOTES
Spouse/ family at BS. Spouse reports pt has had increased weakness/ unable to ambulate with assistance x 2 days. Pt is not on hospice per spouse. Spouse requesting hospice eval for pt. ERP notified.

## 2019-06-30 NOTE — ED PROVIDER NOTES
ED Provider Note    Scribed for Omar Dias M.D. by Kale Gray. 6/29/2019, 5:52 PM.    Primary care provider: Judson Roman M.D.  Means of arrival: EMS  History obtained from: EMS  History limited by: ALOC    CHIEF COMPLAINT  Chief Complaint   Patient presents with   • ALOC       HPI  Will Fuentes is a 68 y.o. male who presents to the Emergency Department via EMS due to an ALOC and weakness. Per EMS, the family stated that the patient was more lethargic and weak since last night, and he became altered today. Patient does have a history of terminal lung cancer. He currently takes Morphine and Norco for pain management. Patient also has a history of COPD. Further history of present illness cannot be obtained due to the patient's ALOC.     REVIEW OF SYSTEMS  See HPI for further details. Further history of present illness cannot be obtained due to the patient's ALOC.     PAST MEDICAL HISTORY   has a past medical history of Breath shortness; Chronic left shoulder pain; Chronic low back pain; COPD; Drug-seeking behavior; Heart burn; Hypertension; Hypertension; Pain; Psoriasis; and Psychiatric problem.    SURGICAL HISTORY   has a past surgical history that includes other orthopedic surgery; shoulder surgery; and other orthopedic surgery.    SOCIAL HISTORY  Social History   Substance Use Topics   • Smoking status: Current Every Day Smoker     Packs/day: 0.50     Types: Cigarettes   • Smokeless tobacco: Never Used      Comment: 2 ppd for 50 yrs   • Alcohol use 1.2 oz/week     2 Cans of beer per week      Comment: occassionally      History   Drug Use     Comment: Prescription  (morphine and norco)       FAMILY HISTORY  None noted.    CURRENT MEDICATIONS  Reviewed.  See Encounter Summary.     ALLERGIES  Allergies   Allergen Reactions   • Darvocet [Propoxyphene N-Apap]    • Flexeril [Cyclobenzaprine Hcl]    • Nsaids Swelling     Fluid retention       PHYSICAL EXAM  VITAL SIGNS: BP (!) 179/85   Pulse 87    Temp 37 °C (98.6 °F) (Tympanic)   Resp (!) 22   Wt 55 kg (121 lb 4.1 oz)   SpO2 95%   BMI 17.91 kg/m²     Constitutional: Altered level of consciousness. Cachectic.  HENT: dry mucous membranes, No signs of trauma, Bilateral external ears normal, Nose normal.  Eyes: Pupils are equal and reactive, Conjunctiva normal, Non-icteric.   Neck: Normal range of motion, No tenderness, Supple, No stridor.   Cardiovascular: Regular rate and rhythm, no murmurs.   Thorax & Lungs: Coarse breath sounds, No chest tenderness.   Abdomen: Bowel sounds normal, Soft, No tenderness, No masses, No pulsatile masses. No peritoneal signs.  Skin: Warm, Dry, No erythema, No rash.   Back: No bony tenderness, No CVA tenderness.   Extremities: Intact distal pulses, No edema, No tenderness, No cyanosis  Musculoskeletal: Good range of motion in all major joints. No tenderness to palpation or major deformities noted.   Neurologic: A/O x2, Normal motor function.  Psychiatric: Unable to obtain due to altered condition.      DIAGNOSTIC STUDIES / PROCEDURES     LABS  Results for orders placed or performed during the hospital encounter of 06/29/19   Lactic acid (lactate)   Result Value Ref Range    Lactic Acid 1.2 0.5 - 2.0 mmol/L   CBC WITH DIFFERENTIAL   Result Value Ref Range    WBC 9.5 4.8 - 10.8 K/uL    RBC 4.89 4.70 - 6.10 M/uL    Hemoglobin 14.4 14.0 - 18.0 g/dL    Hematocrit 42.7 42.0 - 52.0 %    MCV 87.3 81.4 - 97.8 fL    MCH 29.4 27.0 - 33.0 pg    MCHC 33.7 33.7 - 35.3 g/dL    RDW 39.2 35.9 - 50.0 fL    Platelet Count 238 164 - 446 K/uL    MPV 11.3 9.0 - 12.9 fL    Neutrophils-Polys 73.60 (H) 44.00 - 72.00 %    Lymphocytes 15.20 (L) 22.00 - 41.00 %    Monocytes 8.80 0.00 - 13.40 %    Eosinophils 1.20 0.00 - 6.90 %    Basophils 0.80 0.00 - 1.80 %    Immature Granulocytes 0.40 0.00 - 0.90 %    Nucleated RBC 0.00 /100 WBC    Neutrophils (Absolute) 6.97 1.82 - 7.42 K/uL    Lymphs (Absolute) 1.44 1.00 - 4.80 K/uL    Monos (Absolute) 0.83 0.00 -  0.85 K/uL    Eos (Absolute) 0.11 0.00 - 0.51 K/uL    Baso (Absolute) 0.08 0.00 - 0.12 K/uL    Immature Granulocytes (abs) 0.04 0.00 - 0.11 K/uL    NRBC (Absolute) 0.00 K/uL   COMP METABOLIC PANEL   Result Value Ref Range    Sodium 130 (L) 135 - 145 mmol/L    Potassium 4.9 3.6 - 5.5 mmol/L    Chloride 98 96 - 112 mmol/L    Co2 22 20 - 33 mmol/L    Anion Gap 10.0 0.0 - 11.9    Glucose 118 (H) 65 - 99 mg/dL    Bun 23 (H) 8 - 22 mg/dL    Creatinine 1.12 0.50 - 1.40 mg/dL    Calcium 10.3 8.5 - 10.5 mg/dL    AST(SGOT) 20 12 - 45 U/L    ALT(SGPT) 10 2 - 50 U/L    Alkaline Phosphatase 86 30 - 99 U/L    Total Bilirubin 0.6 0.1 - 1.5 mg/dL    Albumin 3.8 3.2 - 4.9 g/dL    Total Protein 8.9 (H) 6.0 - 8.2 g/dL    Globulin 5.1 (H) 1.9 - 3.5 g/dL    A-G Ratio 0.7 g/dL   URINALYSIS   Result Value Ref Range    Color Yellow     Character Clear     Specific Gravity 1.021 <1.035    Ph 5.5 5.0 - 8.0    Glucose Negative Negative mg/dL    Ketones Trace (A) Negative mg/dL    Protein 30 (A) Negative mg/dL    Bilirubin Negative Negative    Urobilinogen, Urine 1.0 Negative    Nitrite Negative Negative    Leukocyte Esterase Negative Negative    Occult Blood Small (A) Negative    Micro Urine Req Microscopic    PROTHROMBIN TIME   Result Value Ref Range    PT 12.6 12.0 - 14.6 sec    INR 0.92 0.87 - 1.13   APTT   Result Value Ref Range    APTT 26.7 24.7 - 36.0 sec   AMMONIA   Result Value Ref Range    Ammonia 19 11 - 45 umol/L   URINE DRUG SCREEN   Result Value Ref Range    Amphetamines Urine Negative Negative    Barbiturates Negative Negative    Benzodiazepines Negative Negative    Cocaine Metabolite Negative Negative    Methadone Negative Negative    Opiates Positive (A) Negative    Oxycodone Negative Negative    Phencyclidine -Pcp Negative Negative    Propoxyphene Negative Negative    Cannabinoid Metab Negative Negative   ESTIMATED GFR   Result Value Ref Range    GFR If African American >60 >60 mL/min/1.73 m 2    GFR If Non African American  >60 >60 mL/min/1.73 m 2   URINE MICROSCOPIC (W/UA)   Result Value Ref Range    WBC 0-2 (A) /hpf    RBC 10-20 (A) /hpf    Bacteria Negative None /hpf    Epithelial Cells Negative /hpf    Hyaline Cast 0-2 /lpf       All labs were reviewed by me.      RADIOLOGY  DX-CHEST-PORTABLE (1 VIEW)   Final Result      1.  There are multiple bilateral pulmonary nodules, new since the prior chest x-ray and increased in size since the recent PET/CT. These could represent metastatic nodules versus septic emboli.   2.  Fullness in the mediastinum which could be due to vascular prominence or adenopathy as was seen on prior PET/CT.      CT-HEAD W/O    (Results Pending)     The radiologist's interpretation of all radiological studies and images have been reviewed by me.    COURSE & MEDICAL DECISION MAKING  Pertinent Labs & Imaging studies reviewed. (See chart for details)    5:52 PM - Patient seen and examined at bedside. Patient will be treated with NS infusion 1000 mL. Ordered DX-chest, lactate, INR, APTT, ammonia, UDS, cbc w/ diff, CMP, UA, urine culture, and blood culture to evaluate his symptoms.     6:31 PM - Conversed with the patient's family over the phone. Discussed intervention vs comfort care measures. Granddaughter believes that the patient is DNR/DNI. Declined any aggressive medical interventions. She adds that the patient's power of  is currently in [place]. She believes he has had a spread of Mets to the brain, so I have ordered a CT-head.      HYDRATION: Based on the patient's presentation of Dehydration the patient was given IV fluids. IV Hydration was used because oral hydration was not adequate alone. Upon recheck following hydration, the patient was improved.        Decision Making:  This is a 68 y.o. year old male who presents with altered mentation, hallucinations and delirium.  Patient has history of known metastatic lung cancer.  Clinical decline over the last 48 hours.  Wife is at this point unable to  take care of him.  Prior conversation with hospice and hospice had been denied.  Patient currently DNR/DNI with routine medical care.  Wife now agreeable to further conversation about hospice and further home health care options.  Laboratory evaluation as noted above largely unremarkable.  Chest x-ray shows worsening of oncologic process.  CT of the head is pending.  I have discussed the case with the hospital service was agreeable ongoing inpatient care for the above reasons.  He will follow-up on the head CT.      DISPOSITION:  Patient will be admitted to [facility] in guarded condition.      FINAL IMPRESSION  1. Delirium    2. Hallucinations    3. Primary malignant neoplasm of lung metastatic to other site, unspecified laterality (HCC)          Kale BARRAZA (Scribe), am scribing for, and in the presence of, Omar Dias M.D..    Electronically signed by: Kale Gray (Scribe), 6/29/2019    Omar BARRAZA M.D. personally performed the services described in this documentation, as scribed by Kale Gray in my presence, and it is both accurate and complete. C    The note accurately reflects work and decisions made by me.  Omar Dias  6/29/2019  8:35 PM

## 2019-06-30 NOTE — ED NOTES
Chief Complaint   Patient presents with   • ALOC     Pt BIB ambulance from home for ALOC. Per EMS family states the pt was lethargic last night and became increasingly altered this morning. Pt able to follow commands but remains altered. Pt has history of lung cancer and takes morphine and norco for pain which he reports taking today. Pt changed to gown and hooked up to bedside monitor.

## 2019-06-30 NOTE — PROGRESS NOTES
"Received report from ER RN and assumed care of patient at transfer.  Patient said \"hi\" at one point but is otherwise lethargic and confused and cannot answer orientation questions.  Patient is restless and impulsive and, per family, will attempt to get out of bed even though he cannot walk.  Patient medicated with IV dilaudid and haldol per MAR and resting comfortably now.  Zakiya, patient's common law spouse, was at bedside at transfer to unit and assisted with admission profile.  She would like to discuss hospice with team in AM.  Patient incontinent of urine and stool; condom catheter in place.  18G IV in R FA running NS at 83 per MAR.  Plan of care reviewed with family, patient board updated, family oriented to room/unit/call light, safety precautions in place, and frequent rounding in practice.         "

## 2019-06-30 NOTE — CARE PLAN
Problem: Communication  Goal: The ability to communicate needs accurately and effectively will improve  Outcome: PROGRESSING AS EXPECTED    Intervention: [place] patient and significant other/support system to call light to alert staff of needs  Patient is not alert. Family oriented to unit, room, and call light.      Problem: Safety  Goal: Will remain free from falls  Outcome: PROGRESSING AS EXPECTED    Intervention: Implement fall precautions  Environmental and safety precautions in place including bed alarm and three bed rails.

## 2019-06-30 NOTE — ED NOTES
Pt incontinent of urine. Pt cleaned, new brief applied. Pt comfort measures provided. Nicotine patch applied to right deltoid. Family remains at BS. Updated on POC.

## 2019-06-30 NOTE — PROGRESS NOTES
Hospital Medicine Daily Progress Note    Date of Service  6/30/2019    Chief Complaint  68 y.o. male admitted 6/29/2019 with acute metabolic encephalopathy due to brain metastases      Interval Problem Update  Patient is resting in bed, family is at bedside, I have discussed with patient's significant other and patient's mother, this time due to very critical condition and poor prognosis patient has been transitioned to comfort care patient's mother and significant other agreed and did not want any aggressive intervention they just want patient to be comfortable, discussed with nurse staff, continue comfort care protocol    Consultants/Specialty  None    Code Status  Comfort care    Disposition  To be determined    Review of Systems  Review of Systems   Unable to perform ROS: Acuity of condition        Physical Exam  Temp:  [36.6 °C (97.9 °F)-37 °C (98.6 °F)] 36.6 °C (97.9 °F)  Pulse:  [52-87] 87  Resp:  [14-22] 19  BP: (152-179)/() 170/115  SpO2:  [80 %-99 %] 80 %    Physical Exam   Constitutional: He appears ill.   HENT:   Head: Normocephalic and atraumatic.   Mouth/Throat: No oropharyngeal exudate.   Eyes: Conjunctivae are normal. Right eye exhibits no discharge. Left eye exhibits no discharge. No scleral icterus.   Neck: Normal range of motion. Neck supple. No JVD present.   Cardiovascular: Regular rhythm and normal heart sounds.    Pulmonary/Chest: Effort normal. No stridor. No respiratory distress. He has no wheezes. He has rales.   Abdominal: Soft. Bowel sounds are normal. He exhibits no distension. There is no tenderness.   Musculoskeletal: He exhibits no edema.   Lymphadenopathy:     He has no cervical adenopathy.   Neurological: He is unresponsive.   Obtunded, does not follows commands.   Skin: Skin is dry.   Vitals reviewed.      Fluids    Intake/Output Summary (Last 24 hours) at 06/30/19 1351  Last data filed at 06/29/19 2315   Gross per 24 hour   Intake                0 ml   Output                 0 ml   Net                0 ml       Laboratory  Recent Labs      06/29/19   1736  06/30/19   0049   WBC  9.5  10.7   RBC  4.89  4.68*   HEMOGLOBIN  14.4  13.7*   HEMATOCRIT  42.7  41.5*   MCV  87.3  88.7   MCH  29.4  29.3   MCHC  33.7  33.0*   RDW  39.2  40.0   PLATELETCT  238  216   MPV  11.3  11.1     Recent Labs      06/29/19   1736  06/30/19   0049   SODIUM  130*  130*   POTASSIUM  4.9  4.2   CHLORIDE  98  100   CO2  22  23   GLUCOSE  118*  103*   BUN  23*  18   CREATININE  1.12  0.96   CALCIUM  10.3  9.8     Recent Labs      06/29/19 1736   APTT  26.7   INR  0.92               Imaging  CT-HEAD W/O   Final Result      1.  Acute obstructive hydrocephalus secondary to probable right cerebellar or cerebellar vermian mass measuring up to 3.5 cm with marked adjacent cerebellar and brainstem edema resulting in effacement of the basal cisterns and fourth ventricle. Finding    could be secondary to primary or metastatic neoplasm. Less likely, subacute hemorrhage could have this appearance as well.   2.  Mild diffuse cerebral substance loss.         Attempts to convey these findings to Dr. GINA POLLOCK were initiated on 6/29/2019 9:24 PM. These findings were discussed with Dr. Randall for Dr. GINA POLLOCK on 6/29/2019 9:50 PM. Emergent neurosurgical consultation was recommended.      DX-CHEST-PORTABLE (1 VIEW)   Final Result      1.  There are multiple bilateral pulmonary nodules, new since the prior chest x-ray and increased in size since the recent PET/CT. These could represent metastatic nodules versus septic emboli.   2.  Fullness in the mediastinum which could be due to vascular prominence or adenopathy as was seen on prior PET/CT.           Assessment/Plan  * Acute metabolic encephalopathy- (present on admission)   Assessment & Plan    Most likely due to cancer with brain metastases with cerebellar mass causing obstructive hydrocephalus, discussed with patient's significant other and also discussed with  patient mother regarding poor prognosis, at this time family has agree with starting comfort measure did not want any aggressive intervention they just want to make sure patient is comfortable, hospice has been consulted and comfort care protocol has been started     Obstructive hydrocephalus   Assessment & Plan    Brainstem metastatic lesion, patient's family does not want any aggressive intervention they just want him to be comfortable     Hyponatremia- (present on admission)   Assessment & Plan    Comfort care      Metastatic lung cancer (metastasis from lung to other site) (HCC)   Assessment & Plan    With metastasis to the brain, very poor prognosis, patient is comfort care measures now     Hypertension- (present on admission)   Assessment & Plan    Comfort care measures     Hyperglycemia- (present on admission)   Assessment & Plan    Mild          VTE prophylaxis: Comfort care protocol    38 minutes spent on this this encounter by myself today, greater than 50% on counseling and coordination of care, discussing with staff and with patient's family.

## 2019-06-30 NOTE — ED NOTES
Pt back from CT, resting quietly with eyes closed, full even resp noted. Family at BS. Updated on POC.

## 2019-06-30 NOTE — RESPIRATORY CARE
COPD EDUCATION by COPD CLINICAL EDUCATOR  6/30/2019 at 6:59 AM by Carly Scott     Patient reviewed by COPD education team. Patient does not qualify for the COPD program.

## 2019-06-30 NOTE — PROGRESS NOTES
Family at bedside. Patient obtunded. Q2 turns in place. Morphine in use PRN for pain with ativan for restlessness. Family provided education regarding comfort measures. Call light within reach, all appropriate fall precautions in place and personal belongings within reach; hourly rounding in place. No needs at this time. See flowsheets for further assessment details.      .

## 2019-07-01 NOTE — PROGRESS NOTES
"Pt is on comfort care. Limited assessment performed. Family present at bedside, anxious. Pt is obtunded, occasionally moves arm, opened eyes briefly when turning. Family request to turn O2 down from 4L to 1L. Heels elevated on pillow, pillows under arms. Mouth swabbed. Family declined lip moisturizer, states, \"he hated it\". Medicated with PRN pain medications to keep pt comfortable and decreases audibile oral secretions. Held scheduled PO tab, pt is unable to swallow at this time. Respirations are labored, heavy, and apneic. Incontinence, condom cath in place. BLE feet are cold. Skin is pale, fragile. Established POC with family for the evening shift. Pt appears to be resting comfortably.   "

## 2019-07-01 NOTE — PROGRESS NOTES
Assumed care of pt @0700. Bedside report received. Pt obtunded. Comfort care measures in place. Pain controlled by Morphine 10 mg q 1-2 h. O2 for comfort per family requests. Spouse by the bedside, anxious. Frequent phone calls from family members. Condom cath in place. Pt family declines q 2 h turns despite education, heels and elbow elevated. 2 p assist. Fall precaution in place. POC discussed with pt, all questions answered at this time. Pt makes needs known, call light within reach, hourly rounding in place.

## 2019-07-01 NOTE — PROGRESS NOTES
[facility] Daily Progress Note    Date of Service  7/1/2019    Chief Complaint  68 y.o. male admitted 6/29/2019 with acute metabolic encephalopathy due to brain metastases      Interval Problem Update  Patient is resting in bed, he looks comfortable, does not look in pain or in distress, significant other at bedside, discussed regarding plan of care, discussed with nurse staff no new events overnight.    Consultants/Specialty  None    Code Status  Comfort care    Disposition  To be determined    Review of Systems  Review of Systems   Unable to perform ROS: Acuity of condition        Physical Exam       Physical Exam   Constitutional: He appears ill. No distress.   HENT:   Head: Normocephalic and atraumatic.   Eyes: Conjunctivae are normal. No scleral icterus.   Neck: Normal range of motion. Neck supple.   Cardiovascular: Regular rhythm and normal heart sounds.    Pulmonary/Chest: Effort normal. No respiratory distress. He has no wheezes. He has rales.   Abdominal: Soft. Bowel sounds are normal. He exhibits no distension. There is no rebound.   Musculoskeletal: He exhibits no edema.   Lymphadenopathy:     He has no cervical adenopathy.   Neurological: He is unresponsive.   Obtunded, does not follows commands.   Skin: Skin is dry.   Vitals reviewed.      Fluids    Intake/Output Summary (Last 24 hours) at 07/01/19 1230  Last data filed at 07/01/19 0812   Gross per 24 hour   Intake                0 ml   Output                0 ml   Net                0 ml       Laboratory  Recent Labs      06/29/19   1736  06/30/19 0049   WBC  9.5  10.7   RBC  4.89  4.68*   HEMOGLOBIN  14.4  13.7*   HEMATOCRIT  42.7  41.5*   MCV  87.3  88.7   MCH  29.4  29.3   MCHC  33.7  33.0*   RDW  39.2  40.0   PLATELETCT  238  216   MPV  11.3  11.1     Recent Labs      06/29/19   1736  06/30/19   0049   SODIUM  130*  130*   POTASSIUM  4.9  4.2   CHLORIDE  98  100   CO2  22  23   GLUCOSE  118*  103*   BUN  23*  18   CREATININE  1.12  0.96    CALCIUM  10.3  9.8     Recent Labs      06/29/19   1736   APTT  26.7   INR  0.92               Imaging  CT-HEAD W/O   Final Result      1.  Acute obstructive hydrocephalus secondary to probable right cerebellar or cerebellar vermian mass measuring up to 3.5 cm with marked adjacent cerebellar and brainstem edema resulting in effacement of the basal cisterns and fourth ventricle. Finding    could be secondary to primary or metastatic neoplasm. Less likely, subacute hemorrhage could have this appearance as well.   2.  Mild diffuse cerebral substance loss.         Attempts to convey these findings to Dr. GINA POLLOCK were initiated on 6/29/2019 9:24 PM. These findings were discussed with Dr. Randall for Dr. GINA POLLOCK on 6/29/2019 9:50 PM. Emergent neurosurgical consultation was recommended.      DX-CHEST-PORTABLE (1 VIEW)   Final Result      1.  There are multiple bilateral pulmonary nodules, new since the prior chest x-ray and increased in size since the recent PET/CT. These could represent metastatic nodules versus septic emboli.   2.  Fullness in the mediastinum which could be due to vascular prominence or adenopathy as was seen on prior PET/CT.           Assessment/Plan  * Acute metabolic encephalopathy- (present on admission)   Assessment & Plan    Most likely due to cancer with brain metastases with cerebellar mass causing obstructive hydrocephalus.  Poor prognosis  Comfort care measures     Obstructive hydrocephalus   Assessment & Plan    Brainstem metastatic lesion, patient's family does not want any aggressive intervention they just want him to be comfortable     Hyponatremia- (present on admission)   Assessment & Plan    Comfort care      Metastatic lung cancer (metastasis from lung to other site) (HCC)   Assessment & Plan    With metastasis to the brain, very poor prognosis, patient is comfort care measures now     Hypertension- (present on admission)   Assessment & Plan    Comfort care measures      Hyperglycemia- (present on admission)   Assessment & Plan    Mild          VTE prophylaxis: Comfort care protocol

## 2019-07-01 NOTE — DISCHARGE PLANNING
Care Transition Team Assessment    Information for this assessment was complied from a bedside interview with the pt's CHARLY Boseence as well as chart information. Zakiya report she and the pt have co-habitated for the last 35 years. Pt does not have an AD or POA. The pt's NOK decision makers appear to be the pt's adult children, Nazanin. SO informed this LSW that both the pt's children are in route to see the pt and should be bedside later on this evening. PETER Interiano with [facility] reports they have discussed having the pt move to hospice; however, according to Laisha, the pt does not meet criteria for ip hospice so the pt has been transitioned to CC. Prior to hospitalization the pt was completly independent with all ADL/IADLs. The pt drove a vehicle.SO reports the pt has a PCP and is registered with [facility] in [place]. Pt is currently on, CC and is not expected to dc the hospital.       Information Source  Orientation : Unable to Assess  Information Given By: Significant Other  Informant's Name: Zakiya Larios  Who is responsible for making decisions for patient? : Legal next of kin  Name(s) of Primary Decision Maker: Nazanin Fuentes. (Children)    Readmission Evaluation  Is this a readmission?: No    Elopement Risk  Legal Hold: No  Ambulatory or Self Mobile in Wheelchair: No-Not an Elopement Risk  Elopement Risk: Not at Risk for Elopement    Interdisciplinary Discharge Planning  Does Admitting Nurse Feel This Could be a Complex Discharge?: Yes  Lives with - Patient's Self Care Capacity: Friends  Patient or legal guardian wants to designate a caregiver (see row info): No  Support Systems: Friends / Neighbors, Spouse / Significant Other  Housing / Facility: Mobile Home  Do You Take your Prescribed Medications Regularly: Yes  Able to Return to Previous ADL's: No  Prior Services: None  Patient Expects to be Discharged to:: n/a  Assistance Needed: Yes  Durable Medical Equipment: Not  Applicable    Discharge Preparedness  What is your plan after discharge?: Other (comment) (CC)  What are your discharge supports?: Partner  Prior Functional Level: Ambulatory, Drives Self, Independent with Activities of Daily Living, Independent with Medication Management  Difficulity with ADLs: None  Difficulity with IADLs: None    Functional Assesment  Prior Functional Level: Ambulatory, Drives Self, Independent with Activities of Daily Living, Independent with Medication Management    Finances  Financial Barriers to Discharge: No  Prescription Coverage: Yes ([facility])    Vision / Hearing Impairment  Vision Impairment : Yes  Right Eye Vision: Impaired, Wears Glasses  Left Eye Vision: Impaired, Wears Glasses  Hearing Impairment : No         Advance Directive  Advance Directive?: Clinically incapacitated / Inappropriate    Domestic Abuse  Have you ever been the victim of abuse or violence?: No  Physical Abuse or Sexual Abuse: No  Verbal Abuse or Emotional Abuse: No  Possible Abuse Reported to:: Not Applicable    Psychological Assessment  History of Substance Abuse: Marijuana  Date Last Used - Marijuana: prior to admittance  Substance Abuse Comments: Pt smoke recreationally  History of Psychiatric Problems: No  Non-compliant with Treatment: No  Newly Diagnosed Illness: Yes    Discharge Risks or Barriers  Discharge risks or barriers?: Post-acute placement / services  Patient risk factors: Vulnerable adult, Substance abuse, Cognitive / sensory / physical deficit    Anticipated Discharge Information  Anticipated discharge disposition: Comfort care

## 2019-07-01 NOTE — DISCHARGE PLANNING
Medical Social Work    LSW left VM for EPS requesting return call. LSW wondering if there is possibly a case open against pt's SO, William, and to make sure William is allowed to be visiting pt at bedside. Per bedside RN, William did argue with pt's mother but has otherwise been appropriate during visits.

## 2019-07-01 NOTE — CARE PLAN
Problem: Skin Integrity  Goal: Risk for impaired skin integrity will decrease  Outcome: PROGRESSING AS EXPECTED  Pt family declines q 2 h turns despite education, heels and elbow elevated.    Problem: Urinary Elimination:  Goal: Ability to reestablish a normal urinary elimination pattern will improve  Outcome: PROGRESSING AS EXPECTED  Condom cath in place.     Problem: Pain Management  Goal: Pain level will decrease to patient's comfort goal  Outcome: PROGRESSING AS EXPECTED  Comfort care measures in place. Pain controlled by Morphine 10 mg q 1-2 h.

## 2019-07-01 NOTE — DISCHARGE PLANNING
Anticipated Discharge Disposition: CC    Action: Hospice      informed by PETER Interiano that they ([facility]) had reviewed the pt and determined that the pt did not meet the criteria for GIP. Pt was transitioned to CC but not onto hospice.     Barriers to Discharge: none    Plan: assist as needed.

## 2019-07-01 NOTE — CARE PLAN
Problem: Psychosocial needs  Goal: Privacy for patient and family    Intervention: Private Room obtained  Completed.       Problem: Elimination  Goal: Urinary elimination support    Intervention: Urinary Diversion  Condom cath in place.

## 2019-07-01 NOTE — DISCHARGE PLANNING
Anticipated Discharge Disposition: hospice    Action: Work Excuse Letters    Provided  Family with 2 work excuse letters. Placed copies in pt chart.     Barriers to Discharge: none    Plan: assist as needed.

## 2019-07-02 NOTE — DISCHARGE PLANNING
Per hospitalist pt's s/o stated that [facility] hospice has been notified and will be coming up to evaluate the patient for hospice.

## 2019-07-02 NOTE — PROGRESS NOTES
Assumed care of pt @0700. Bedside report received. Pt obtunded. Comfort care measures in place. Pain controlled by Morphine 10 mg q 2 h. Spouse by the bedside, anxious. Condom cath in place. Pt family declines q 2 h turns despite education. 2 p assist. Fall precaution in place. POC discussed with pt, all questions answered at this time. Pt makes needs known, call light within reach, hourly rounding in place.

## 2019-07-02 NOTE — CARE PLAN
Problem: Psychosocial needs  Goal: Anxiety reduction  Outcome: PROGRESSING AS EXPECTED    Intervention: Pharmacologic management per MD order  Administering PRN ativan.       Problem: Skin Integrity  Goal: Skin Integrity is maintained  Outcome: PROGRESSING SLOWER THAN EXPECTED    Intervention: Turn every 2 hours  Attempting to turn pt as frequently as pt's family will allow staff.   Intervention: Protect pressure points  Heels floated on pillows.       Problem: Elimination  Goal: Urinary elimination support  Outcome: PROGRESSING AS EXPECTED    Intervention: Urinary Diversion  Condom cath remains in place.

## 2019-07-02 NOTE — PROGRESS NOTES
"Pt on comfort care, currently resting comfortably in bed, calm, sleeping, breathing remains mildly labored. Family present at bedside. Repositioned pt, mouth swabbed, family declines lip moisturizer, states pt \"hates it\". Pt appears to more pale/ jaundice today. Medicated with PRN medications when pt becomes symptomatic/ at family request. Condom cath in place, no BM. Heels elevated on pillows. IV patent. Pt remains unable to tolerate PO, obtunded. All family needs met at this time.   "

## 2019-07-02 NOTE — DISCHARGE PLANNING
Received Choice form at  0973 from Doreen GARCIA  Agency/Facility Name: [facility]   Referral sent per Choice form @ [id number]

## 2019-07-02 NOTE — CARE PLAN
Problem: Skin Integrity  Goal: Risk for impaired skin integrity will decrease  Outcome: PROGRESSING AS EXPECTED  Pt family refuses to turn and reposition pt despite education.     Problem: Pain Management  Goal: Pain level will decrease to patient's comfort goal  Outcome: PROGRESSING AS EXPECTED  Pain controlled by IV Morphine 10 mg.

## 2019-07-02 NOTE — DISCHARGE PLANNING
"[facility] hospice admissions counselor came to evaulate patient and discuss options. PT's BS nurse came into this RNCM's office stated that son had contacted her and stated he did not want patient moved out of the hospital. This RNCM attempted to contact patient x2 left messages both times with no return call, he does no have MPOA nor does his daughter. PTs daughter at bedside stated \"my father has said in the past he did not want to die in the hospital\" She signed choice for [facility] hospice. Explained that referral would be sent but if PT is able to be transferred home it would not occur until tomorrow once hospice had DME set up in the home. Daughter and PTs girlfriend verbalized understanding. BS nurse informed of discussion.   "

## 2019-07-02 NOTE — PROGRESS NOTES
Hospital Medicine Daily Progress Note    Date of Service  7/2/2019    Chief Complaint  68 y.o. male admitted 6/29/2019 with altered mental status.    Hospital Course   Patient with history of metastatic lung cancer, diagnosed year prior and treatment was not recommended.  The 3 days prior to admission, he became very fidgety and confused with hallucinations.  Patient's family wished for him to be comfortable and he has been transitioned to comfort care as prognosis is very grim.      Interval Problem Update  Patient obtunded with breathing very rapid and shallow.  Family in process of contacting hospice care - has discussed with [facility] hospice.      Consultants/Specialty  none    Code Status  Comfort care    Disposition  hospice    Review of Systems  Review of Systems   Unable to perform ROS: Patient unresponsive        Physical Exam       Physical Exam   Constitutional: He appears well-developed and well-nourished. No distress.   HENT:   Head: Normocephalic and atraumatic.   Eyes: Conjunctivae are normal. No scleral icterus.   Neck: Neck supple. No JVD present.   Cardiovascular: Normal rate, regular rhythm and normal heart sounds.  Exam reveals no gallop and no friction rub.    No murmur heard.  Pulmonary/Chest: Effort normal and breath sounds normal. No respiratory distress. He has no wheezes. He exhibits no tenderness.   Abdominal: Soft. Bowel sounds are normal. He exhibits no distension and no mass. There is no tenderness.   Musculoskeletal: He exhibits no edema or tenderness.   Neurological: No cranial nerve deficit.   Obtunded     Skin: Skin is warm and dry. He is not diaphoretic. No erythema. No pallor.   Psychiatric:   Obtunded     Nursing note and vitals reviewed.      Fluids    Intake/Output Summary (Last 24 hours) at 07/02/19 1630  Last data filed at 07/02/19 0400   Gross per 24 hour   Intake                0 ml   Output                0 ml   Net                0 ml       Laboratory  Recent Labs       06/29/19 1736  06/30/19   0049   WBC  9.5  10.7   RBC  4.89  4.68*   HEMOGLOBIN  14.4  13.7*   HEMATOCRIT  42.7  41.5*   MCV  87.3  88.7   MCH  29.4  29.3   MCHC  33.7  33.0*   RDW  39.2  40.0   PLATELETCT  238  216   MPV  11.3  11.1     Recent Labs      06/29/19 1736  06/30/19   0049   SODIUM  130*  130*   POTASSIUM  4.9  4.2   CHLORIDE  98  100   CO2  22  23   GLUCOSE  118*  103*   BUN  23*  18   CREATININE  1.12  0.96   CALCIUM  10.3  9.8     Recent Labs      06/29/19 1736   APTT  26.7   INR  0.92               Imaging  CT-HEAD W/O   Final Result      1.  Acute obstructive hydrocephalus secondary to probable right cerebellar or cerebellar vermian mass measuring up to 3.5 cm with marked adjacent cerebellar and brainstem edema resulting in effacement of the basal cisterns and fourth ventricle. Finding    could be secondary to primary or metastatic neoplasm. Less likely, subacute hemorrhage could have this appearance as well.   2.  Mild diffuse cerebral substance loss.         Attempts to convey these findings to Dr. GINA POLLOCK were initiated on 6/29/2019 9:24 PM. These findings were discussed with Dr. Randall for Dr. GINA POLLOCK on 6/29/2019 9:50 PM. Emergent neurosurgical consultation was recommended.      DX-CHEST-PORTABLE (1 VIEW)   Final Result      1.  There are multiple bilateral pulmonary nodules, new since the prior chest x-ray and increased in size since the recent PET/CT. These could represent metastatic nodules versus septic emboli.   2.  Fullness in the mediastinum which could be due to vascular prominence or adenopathy as was seen on prior PET/CT.           Assessment/Plan  * Acute metabolic encephalopathy- (present on admission)   Assessment & Plan    Most likely due to cancer with brain metastases with cerebellar mass causing obstructive hydrocephalus.  Poor prognosis  Comfort care measures     Obstructive hydrocephalus   Assessment & Plan    Brainstem metastatic lesion, patient's family  does not want any aggressive intervention they just want him to be comfortable     Hyponatremia- (present on admission)   Assessment & Plan    Comfort care      Metastatic lung cancer (metastasis from lung to other site) (HCC)   Assessment & Plan    With metastasis to the brain, very poor prognosis, patient is comfort care measures now     Hypertension- (present on admission)   Assessment & Plan    Comfort care measures     Hyperglycemia- (present on admission)   Assessment & Plan    Mild          VTE prophylaxis: comfort care

## 2019-07-03 NOTE — DISCHARGE SUMMARY
Death Summary    Cause of Death  Cardiopulmonary arrest due to respiratory failure due to metastatic lung cancer.    Comorbid Conditions at the Time of Death  Principal Problem:    Acute metabolic encephalopathy POA: Yes  Active Problems:    Obstructive hydrocephalus POA: Unknown    Hyponatremia POA: Yes    Hyperglycemia POA: Yes    Hypertension POA: Yes    Metastatic lung cancer (metastasis from lung to other site) (HCC) POA: Unknown  Resolved Problems:    * No resolved hospital problems. *      History of Presenting Illness and Hospital Course  This is a 68 y.o. male admitted 6/29/2019 with known metastatic lung cancer with no treatment offered at time of diagnosis due to poor anticipated tolerance.  He presented to the ED with increasing confusion.  Given his known poor prognosis, family opted to transition patient to comfort care.  He passed away and family was notified.      Death Date: 07/02/19   Death Time: 2018         Pronounced By (RN1): Evelin Forbes RN  Pronounced By (RN2): Amanda Wilson RN

## 2019-07-03 NOTE — PROGRESS NOTES
Joan Fuentes (pt's daughter) at bedside when pt . RN called pt's son, Anthony Fuentes, first of kin, to inform of pt's expiration. Sean HUMPHREY, Dr. Silva paged back, updated on pt's expiration. Notified , not a 's care, no autopsy requested. Notified tissue bank. Dentures in place, IV removed, scopolamine patch removed. No other belongings identified on pt. Body cleansed, ensured pt properly identified, wristband in place. [employer] called for transport.     Anthony Fuentes has not established a mortuary yet. RN left him with NAM's number to call back with decision.

## 2019-07-04 LAB
BACTERIA BLD CULT: NORMAL
BACTERIA BLD CULT: NORMAL
SIGNIFICANT IND 70042: NORMAL
SIGNIFICANT IND 70042: NORMAL
SITE SITE: NORMAL
SITE SITE: NORMAL
SOURCE SOURCE: NORMAL
SOURCE SOURCE: NORMAL

## 2020-12-28 NOTE — PROGRESS NOTES
Dilma Stratton Fall Risk Assessment:     Last Known Fall: Within the last month  Mobility: Immobilized/requires assist of one person  Medications: Cardiovascular or central nervous system meds  Mental Status/LOC/Awareness: Awake, alert, and oriented to date, place, and person  Toileting Needs: Use of assistive device (Bedside commode, bedpan, urinal)  Volume/Electrolyte Status: Use of IV fluids/tube feeds, Low blood sugar/electrolyte imbalances  Communication/Sensory: Visual (Glasses)/hearing deficit  Behavior: Appropriate behavior  Dilma Stratton Fall Risk Total: 14  Fall Risk Level: MODERATE RISK    Universal Fall Precautions:  call light/belongings in reach, bed in low position and locked, wheelchairs and assistive devices out of sight, siderails up x 2, use non-slip footwear, adequate lighting, clutter free and spill free environment, educate to call for assistance, educate on level of risk    Fall Risk Level Interventions:    TRIAL (TELE 8, NEURO, MED NUBIA 5) Moderate Fall Risk Interventions  Place yellow fall risk ID band on patient: verified  Provide patient/family education based on risk assessment : completed  Educate patient/family to call staff for assistance when getting out of bed: completed  Place fall precaution signage outside patient door: verified  Utilize bed/chair fall alarm: verifiedTRIAL (TELE 8, NEURO, MED NUBIA 5) High Fall Risk Interventions  Place yellow fall risk ID band on patient: completed  Provide patient/family education based on risk assessment: completed  Educate patient/family to call staff for assistance when getting out of bed: completed  Place fall precaution signage outside patient door: completed  Place patient in room close to nursing station: completed  Utilize bed/chair fall alarm: completed  Notify charge of high risk for huddle: completed    Patient Specific Interventions:     Medication: review medications with patient and family, assess for medications that can be  RLE venous stasis ulcer  · Bilateral Legs wraps performed in the home with Sister Doreen Ribera   · Patient does not want HH ordered to continue leg wraps due to risk of COVID   discontinued or dosage decreased and limit combination of prn medications  Mental Status/LOC/Awareness: reinforce falls education, check on patient hourly, utilize bed/chair fall alarm and reinforce the use of call light  Toileting: consider obtaining elevated toilet seat or bedside commode (BSC), provide frquent toileting, instruct male patients prone to dizziness to void while sitting, instruct patient/family on the use of grab bars, instruct patient/family on the need to call for assistance when toileting and do not leave patient unattended in bathroom/refer to toileting scripting  Volume/Electrolyte Status: ensure patient remains hydrated, advance diet as tolerated, monitor abnormal lab values and ensure IV fluids are appropriate  Communication/Sensory: update plan of care on whiteboard, ensure proper positioning when transferrng/ambulating and ensure patient has glasses/contacts and hearing aids/dentures  Behavioral: encourage patient to voice feelings, administer medication as ordered and instruct/reinforce fall program rationale  Mobility: provide comfort measures during transport, dangle prior to standing, utilize bed/chair fall alarm, ensure bed is locked and in lowest position, provide appropriate assistive device and instruct patient to exit bed on their strongest side
